# Patient Record
Sex: FEMALE | Race: BLACK OR AFRICAN AMERICAN | Employment: FULL TIME | URBAN - METROPOLITAN AREA
[De-identification: names, ages, dates, MRNs, and addresses within clinical notes are randomized per-mention and may not be internally consistent; named-entity substitution may affect disease eponyms.]

---

## 2017-03-17 ENCOUNTER — OFFICE VISIT (OUTPATIENT)
Dept: SURGERY | Facility: CLINIC | Age: 47
End: 2017-03-17

## 2017-03-17 VITALS
SYSTOLIC BLOOD PRESSURE: 138 MMHG | DIASTOLIC BLOOD PRESSURE: 79 MMHG | WEIGHT: 168.06 LBS | HEART RATE: 61 BPM | OXYGEN SATURATION: 100 % | BODY MASS INDEX: 30.93 KG/M2 | HEIGHT: 62 IN | RESPIRATION RATE: 16 BRPM

## 2017-03-17 DIAGNOSIS — I10 ESSENTIAL HYPERTENSION: Primary | ICD-10-CM

## 2017-03-17 DIAGNOSIS — K21.00 GASTROESOPHAGEAL REFLUX DISEASE WITH ESOPHAGITIS: ICD-10-CM

## 2017-03-17 DIAGNOSIS — R53.82 CHRONIC FATIGUE: ICD-10-CM

## 2017-03-17 DIAGNOSIS — Z98.84 S/P GASTRIC BYPASS: ICD-10-CM

## 2017-03-17 PROBLEM — R53.83 FATIGUE: Status: ACTIVE | Noted: 2017-03-17

## 2017-03-17 PROCEDURE — 99214 OFFICE O/P EST MOD 30 MIN: CPT | Performed by: INTERNAL MEDICINE

## 2017-03-17 NOTE — PROGRESS NOTES
[facility] BARIATRIC AND WEIGHT LOSS CLINIC  [address] [place] [address]  Dept: [phone]    Date: 2016    Patient:  Yelena Metz  :      1970  MRN:      [id number]    Referring Provider: Gennaro Ramírez MD BYPASS/LEEANNA-EN-Y  04/11/2016    Comment Dr Shawna Duckworth       Family History:    Family History   Problem Relation Age of Onset   • Dementia Father 80     Alzheimer's   • Cancer Mother      [de-identified], age 62 (cause of death)   • Heart Disease Sister 47     CAD elevated head of bed and not eat for 1 hour before bedtime. No interval changes were made in her anti-reflux medications.        Hold BP meds  Hold vitamin d    Continue optisource    Blood work due    [id number] Jayne Lema to exercise, keep area dry  Use Hebicleanse over bod

## 2017-04-03 ENCOUNTER — HOSPITAL ENCOUNTER (OUTPATIENT)
Dept: ULTRASOUND IMAGING | Facility: HOSPITAL | Age: 47
Discharge: HOME | End: 2017-04-03
Attending: INTERNAL MEDICINE
Payer: COMMERCIAL

## 2017-04-03 DIAGNOSIS — M79.606 LEG PAIN: ICD-10-CM

## 2017-04-03 DIAGNOSIS — J43.9 EMPHYSEMA LUNG (HCC): ICD-10-CM

## 2017-04-03 PROCEDURE — 93970 EXTREMITY STUDY: CPT

## 2017-05-01 ENCOUNTER — TELEPHONE (OUTPATIENT)
Dept: SURGERY | Facility: CLINIC | Age: 47
End: 2017-05-01

## 2017-05-02 ENCOUNTER — TELEPHONE (OUTPATIENT)
Dept: SURGERY | Facility: CLINIC | Age: 47
End: 2017-05-02

## 2017-05-03 ENCOUNTER — TELEPHONE (OUTPATIENT)
Dept: SURGERY | Facility: CLINIC | Age: 47
End: 2017-05-03

## 2017-05-17 ENCOUNTER — TELEPHONE (OUTPATIENT)
Dept: SURGERY | Facility: CLINIC | Age: 47
End: 2017-05-17

## 2017-05-17 NOTE — TELEPHONE ENCOUNTER
5/17/17 @ 3:03pm Spoke to [address] [place] [address] At [facility]) [phone].  OWK#[id number] She verified that patient has following benefits for Bariatric services: EHV (DFZ#[id number]) DX E66.01 Pt does NOT have  benefits for Dietitian ([id number]) and No Body Com

## 2017-05-18 ENCOUNTER — OFFICE VISIT (OUTPATIENT)
Dept: SURGERY | Facility: CLINIC | Age: 47
End: 2017-05-18

## 2017-05-18 VITALS
RESPIRATION RATE: 16 BRPM | DIASTOLIC BLOOD PRESSURE: 66 MMHG | BODY MASS INDEX: 29.81 KG/M2 | SYSTOLIC BLOOD PRESSURE: 123 MMHG | HEART RATE: 58 BPM | HEIGHT: 62 IN | WEIGHT: 162 LBS

## 2017-05-18 DIAGNOSIS — Z98.84 S/P GASTRIC BYPASS: Primary | ICD-10-CM

## 2017-05-18 RX ORDER — HYDROCHLOROTHIAZIDE 25 MG/1
25 TABLET ORAL DAILY
COMMUNITY
End: 2017-08-01

## 2017-05-18 NOTE — PROGRESS NOTES
[address], [address]  Dept: [phone]    5/18/2017    BARIATRIC SURGERY PATIENT/FOLLOW UP    HPI:  Yasmani Francois is a 55year old-year old female w mild moisture, no active rashes. ASSESSMENT:  Reji Rodríguez is a 55year old-year old female who presents 1 year s/p RYGB  Doing well - no major issues.   Still losing weight, and close to goal weight 155  Good habits - excellent fluid intake and

## 2017-05-31 ENCOUNTER — TELEPHONE (OUTPATIENT)
Dept: SURGERY | Facility: CLINIC | Age: 47
End: 2017-05-31

## 2017-07-28 ENCOUNTER — TELEPHONE (OUTPATIENT)
Dept: SURGERY | Facility: CLINIC | Age: 47
End: 2017-07-28

## 2017-07-28 ENCOUNTER — TELEPHONE (OUTPATIENT)
Dept: FAMILY MEDICINE CLINIC | Facility: CLINIC | Age: 47
End: 2017-07-28

## 2017-07-28 NOTE — TELEPHONE ENCOUNTER
Pt calm on phone in no apparent distress. Denies chest pain, SOB, difficulty breathing. Pt feels daily zyrtec use w/ antihypertensive med has caused this, when home in air conditioning feels better. Pt states will sit back and relax.  If symptoms worsen

## 2017-07-28 NOTE — TELEPHONE ENCOUNTER
Actions Requested: Scheduled for an appt with [facility] this afternoon. Problem: Occasional sob, not sure if it allergies  Onset and Timing: Couple of weeks.   Associated Symptoms: Eye swelling, nasal congestion, chest heaviness  Aggravating by: Exposure to plant

## 2017-08-01 ENCOUNTER — OFFICE VISIT (OUTPATIENT)
Dept: FAMILY MEDICINE CLINIC | Facility: CLINIC | Age: 47
End: 2017-08-01

## 2017-08-01 VITALS
HEART RATE: 69 BPM | SYSTOLIC BLOOD PRESSURE: 128 MMHG | TEMPERATURE: 98 F | BODY MASS INDEX: 29 KG/M2 | DIASTOLIC BLOOD PRESSURE: 85 MMHG | WEIGHT: 160 LBS

## 2017-08-01 DIAGNOSIS — I10 ESSENTIAL HYPERTENSION: Primary | ICD-10-CM

## 2017-08-01 DIAGNOSIS — Z91.09 ENVIRONMENTAL ALLERGIES: ICD-10-CM

## 2017-08-01 PROCEDURE — 99213 OFFICE O/P EST LOW 20 MIN: CPT | Performed by: FAMILY MEDICINE

## 2017-08-01 PROCEDURE — 99212 OFFICE O/P EST SF 10 MIN: CPT | Performed by: FAMILY MEDICINE

## 2017-08-01 RX ORDER — FLUTICASONE PROPIONATE 50 MCG
2 SPRAY, SUSPENSION (ML) NASAL DAILY
Qty: 3 BOTTLE | Refills: 1 | Status: SHIPPED | OUTPATIENT
Start: 2017-08-01 | End: 2019-08-27 | Stop reason: ALTCHOICE

## 2017-08-01 RX ORDER — HYDROCHLOROTHIAZIDE 25 MG/1
25 TABLET ORAL DAILY
Qty: 90 TABLET | Refills: 1 | Status: SHIPPED | OUTPATIENT
Start: 2017-08-01 | End: 2018-11-20

## 2017-08-01 RX ORDER — ALBUTEROL SULFATE 90 UG/1
2 AEROSOL, METERED RESPIRATORY (INHALATION) EVERY 6 HOURS PRN
Qty: 1 INHALER | Refills: 2 | Status: SHIPPED | OUTPATIENT
Start: 2017-08-01 | End: 2020-10-22

## 2017-08-01 NOTE — PROGRESS NOTES
HPI:    Patient ID: Ar Kang is a 55year old female. Had shobha enasal congestioin, heaviness of chest.   Slight cough. Felt tired also. Now much better after taking zyrtec.    Also exercises regularly and never has any chest pain when exerc

## 2018-04-04 ENCOUNTER — TELEPHONE (OUTPATIENT)
Dept: SURGERY | Facility: CLINIC | Age: 48
End: 2018-04-04

## 2018-04-04 NOTE — TELEPHONE ENCOUNTER
Attempted to contact pt as reminder to schedule 2 year post-op f/u with bariatric surgeon, dietitian and bariatrician. No answer then busy signal. Mailed reminder letter sent.

## 2018-11-07 ENCOUNTER — NURSE TRIAGE (OUTPATIENT)
Dept: OTHER | Age: 48
End: 2018-11-07

## 2018-11-07 ENCOUNTER — APPOINTMENT (OUTPATIENT)
Dept: GENERAL RADIOLOGY | Facility: HOSPITAL | Age: 48
End: 2018-11-07
Attending: NURSE PRACTITIONER
Payer: COMMERCIAL

## 2018-11-07 ENCOUNTER — HOSPITAL ENCOUNTER (EMERGENCY)
Facility: HOSPITAL | Age: 48
Discharge: HOME | End: 2018-11-07
Payer: COMMERCIAL

## 2018-11-07 VITALS
BODY MASS INDEX: 28.52 KG/M2 | WEIGHT: 155 LBS | SYSTOLIC BLOOD PRESSURE: 117 MMHG | HEIGHT: 62 IN | TEMPERATURE: 99 F | RESPIRATION RATE: 16 BRPM | DIASTOLIC BLOOD PRESSURE: 69 MMHG | HEART RATE: 72 BPM | OXYGEN SATURATION: 100 %

## 2018-11-07 DIAGNOSIS — J06.9 UPPER RESPIRATORY TRACT INFECTION, UNSPECIFIED TYPE: Primary | ICD-10-CM

## 2018-11-07 PROCEDURE — 99284 EMERGENCY DEPT VISIT MOD MDM: CPT

## 2018-11-07 PROCEDURE — 81003 URINALYSIS AUTO W/O SCOPE: CPT | Performed by: NURSE PRACTITIONER

## 2018-11-07 PROCEDURE — 71046 X-RAY EXAM CHEST 2 VIEWS: CPT | Performed by: NURSE PRACTITIONER

## 2018-11-07 RX ORDER — PREDNISONE 20 MG/1
40 TABLET ORAL DAILY
Qty: 6 TABLET | Refills: 0 | Status: SHIPPED | OUTPATIENT
Start: 2018-11-07 | End: 2018-11-10

## 2018-11-07 RX ORDER — BENZONATATE 100 MG/1
100 CAPSULE ORAL 3 TIMES DAILY PRN
Qty: 15 CAPSULE | Refills: 0 | Status: SHIPPED | OUTPATIENT
Start: 2018-11-07 | End: 2018-11-14

## 2018-11-07 RX ORDER — ONDANSETRON 4 MG/1
4 TABLET, ORALLY DISINTEGRATING ORAL EVERY 6 HOURS PRN
Qty: 15 TABLET | Refills: 0 | Status: SHIPPED | OUTPATIENT
Start: 2018-11-07 | End: 2018-11-14

## 2018-11-07 NOTE — TELEPHONE ENCOUNTER
Action Requested: Summary for Provider     []  Critical Lab, Recommendations Needed  [] Need Additional Advice  []   FYI    []   Need Orders  [] Need Medications Sent to Pharmacy  []  Other     SUMMARY: sent to ER-Pt called requesting Appt-c/c diarrhea/v

## 2018-11-07 NOTE — ED PROVIDER NOTES
Patient Seen in: [facility] AND [facility] Emergency Department    History   CC: congestion   HPI: Reji Rodríguez 50year old female  who presents to the ER c/o productive cough, runny nose, congestion, sinus pressure, generalized myalgias and chills starti Smokeless tobacco: Never Used    Alcohol use: Yes      Comment: Wine, 1 beer rarely    Drug use: No      ROS:  Review of Systems    Positive for stated complaint: congestion   Other systems are as noted in HPI. Constitutional and vital signs reviewed. posterior pharynx is mildly, diffusely erythematous however without tonsilar enlargement or exudate, uvula midline, +gag, voice is clear  Neck - no significant adenopathy, supple with trachea midline  Resp - Lung sounds clear bilaterally and wob unlabored, appear grossly dehydrated. Advised to push fluids as well as general viral/URI instructions reviewed with patient. Cough suppressant as well as nausea medication prescribed as needed.   Advise close follow-up with her primary care provider in the next 1-2

## 2018-11-07 NOTE — ED INITIAL ASSESSMENT (HPI)
Pt to ER with c/o runny nose, cough, vomiting, and diarrhea since yesterday. +body aches and chills. Last tylenol and cold medication at 0645 today. Pt denies chest pain. No respiratory distress noted. 100% on room air. Pt is alert and oriented x4.

## 2018-11-08 NOTE — TELEPHONE ENCOUNTER
CSS pls help schedule a ER f/u visit if needed.  Thanks  Clinical Impression:  Upper respiratory tract infection, unspecified type  (primary encounter diagnosis)     Discharge   Follow-up:  Tammy Garcia MD  Schedule an appointment as soon as possible for

## 2018-11-09 NOTE — TELEPHONE ENCOUNTER
Puma Magdaleno   Em Rn Triage 12 hours ago (6:51 PM)      Pt is scheduled for 11/13 (Routing comment)

## 2018-11-13 ENCOUNTER — OFFICE VISIT (OUTPATIENT)
Dept: FAMILY MEDICINE CLINIC | Facility: CLINIC | Age: 48
End: 2018-11-13
Payer: COMMERCIAL

## 2018-11-13 VITALS
DIASTOLIC BLOOD PRESSURE: 70 MMHG | SYSTOLIC BLOOD PRESSURE: 115 MMHG | HEART RATE: 67 BPM | BODY MASS INDEX: 29.04 KG/M2 | WEIGHT: 157.81 LBS | TEMPERATURE: 98 F | HEIGHT: 62 IN

## 2018-11-13 DIAGNOSIS — J06.9 UPPER RESPIRATORY INFECTION, ACUTE: Primary | ICD-10-CM

## 2018-11-13 PROCEDURE — 99213 OFFICE O/P EST LOW 20 MIN: CPT | Performed by: FAMILY MEDICINE

## 2018-11-13 PROCEDURE — 99212 OFFICE O/P EST SF 10 MIN: CPT | Performed by: FAMILY MEDICINE

## 2018-11-13 RX ORDER — CYCLOBENZAPRINE HCL 10 MG
10 TABLET ORAL 3 TIMES DAILY
Qty: 30 TABLET | Refills: 1 | Status: SHIPPED | OUTPATIENT
Start: 2018-11-13 | End: 2018-12-03

## 2018-11-13 RX ORDER — AZITHROMYCIN 250 MG/1
TABLET, FILM COATED ORAL
Qty: 6 TABLET | Refills: 0 | Status: SHIPPED | OUTPATIENT
Start: 2018-11-13 | End: 2019-08-27 | Stop reason: ALTCHOICE

## 2018-11-13 RX ORDER — RANITIDINE 150 MG/1
150 TABLET ORAL 2 TIMES DAILY
Qty: 40 TABLET | Refills: 0 | Status: SHIPPED | OUTPATIENT
Start: 2018-11-13 | End: 2019-08-27

## 2018-11-13 NOTE — PROGRESS NOTES
HPI:    Patient ID: Robby Radford is a 50year old female. HPI    Review of Systems         Current Outpatient Medications:  benzonatate 100 MG Oral Cap Take 1 capsule (100 mg total) by mouth 3 (three) times daily as needed for cough.  Disp: 15 cap

## 2018-11-13 NOTE — PROGRESS NOTES
HPI:    Patient ID: Rina Guardado is a 50year old female. Since 1 week ago had cough shortness of breath, dizziness, vomiting and diarrhea. Had low grade fever. No more vomiting since yesterday. Cough is mostly dry , still short breath. ..   Has Exam reveals no gallop and no friction rub. No murmur heard. Pulmonary/Chest: Effort normal and breath sounds normal.   Prolonged exp phase   Musculoskeletal: She exhibits tenderness. She exhibits no edema or deformity.    Trapezius tenderness and spasm

## 2018-11-20 ENCOUNTER — OFFICE VISIT (OUTPATIENT)
Dept: SURGERY | Facility: CLINIC | Age: 48
End: 2018-11-20
Payer: COMMERCIAL

## 2018-11-20 VITALS
DIASTOLIC BLOOD PRESSURE: 79 MMHG | BODY MASS INDEX: 28.71 KG/M2 | HEIGHT: 62 IN | RESPIRATION RATE: 16 BRPM | WEIGHT: 156 LBS | HEART RATE: 89 BPM | OXYGEN SATURATION: 100 % | SYSTOLIC BLOOD PRESSURE: 125 MMHG

## 2018-11-20 DIAGNOSIS — I10 ESSENTIAL HYPERTENSION: Primary | ICD-10-CM

## 2018-11-20 DIAGNOSIS — Z98.84 S/P GASTRIC BYPASS: ICD-10-CM

## 2018-11-20 DIAGNOSIS — K21.00 GASTROESOPHAGEAL REFLUX DISEASE WITH ESOPHAGITIS: ICD-10-CM

## 2018-11-20 DIAGNOSIS — R73.09 ABNORMAL BLOOD SUGAR: ICD-10-CM

## 2018-11-20 DIAGNOSIS — E55.9 VITAMIN D DEFICIENCY: ICD-10-CM

## 2018-11-20 DIAGNOSIS — E66.3 OVERWEIGHT (BMI 25.0-29.9): ICD-10-CM

## 2018-11-20 PROCEDURE — 99214 OFFICE O/P EST MOD 30 MIN: CPT | Performed by: INTERNAL MEDICINE

## 2018-11-20 NOTE — PROGRESS NOTES
[facility] BARIATRIC AND WEIGHT LOSS CLINIC  [address] [place] [address]  Dept: [phone]    Date: 2016    Patient:  Garcia Hernandez  :      1970  MRN:      [id number]    Referring Provider: Jerzy Jang MD MCG/ACT Nasal Suspension, 2 sprays by Nasal route daily. , Disp: 3 Bottle, Rfl: 1    Allergies:  Patient has no known allergies.      Social History:  Social History    Tobacco Use      Smoking status: Never Smoker      Smokeless tobacco: Never Used    Alcoh hyper-somnolence. GERD:  The patient states her acid reflux has been well controlled with her current medication. No symptoms of heartburn or indigestion.     Encounter Diagnosis(ses):   Essential hypertension  (primary encounter diagnosis)  Rocio Gaines CBC With Differential With Platelet          Standing Status: Future          Standing Expiration Date: 11/20/2019        Cha Townsend MD

## 2018-11-21 ENCOUNTER — LAB ENCOUNTER (OUTPATIENT)
Dept: LAB | Facility: HOSPITAL | Age: 48
End: 2018-11-21
Attending: INTERNAL MEDICINE
Payer: COMMERCIAL

## 2018-11-21 DIAGNOSIS — K21.00 GASTROESOPHAGEAL REFLUX DISEASE WITH ESOPHAGITIS: ICD-10-CM

## 2018-11-21 DIAGNOSIS — I10 ESSENTIAL HYPERTENSION: ICD-10-CM

## 2018-11-21 DIAGNOSIS — E55.9 VITAMIN D DEFICIENCY: ICD-10-CM

## 2018-11-21 DIAGNOSIS — E66.3 OVERWEIGHT (BMI 25.0-29.9): ICD-10-CM

## 2018-11-21 DIAGNOSIS — Z98.84 S/P GASTRIC BYPASS: ICD-10-CM

## 2018-11-21 DIAGNOSIS — R73.09 ABNORMAL BLOOD SUGAR: ICD-10-CM

## 2018-11-21 PROCEDURE — 84443 ASSAY THYROID STIM HORMONE: CPT

## 2018-11-21 PROCEDURE — 83036 HEMOGLOBIN GLYCOSYLATED A1C: CPT

## 2018-11-21 PROCEDURE — 85025 COMPLETE CBC W/AUTO DIFF WBC: CPT

## 2018-11-21 PROCEDURE — 80061 LIPID PANEL: CPT

## 2018-11-21 PROCEDURE — 80053 COMPREHEN METABOLIC PANEL: CPT

## 2018-11-21 PROCEDURE — 82306 VITAMIN D 25 HYDROXY: CPT

## 2018-11-21 PROCEDURE — 36415 COLL VENOUS BLD VENIPUNCTURE: CPT

## 2018-11-21 PROCEDURE — 82607 VITAMIN B-12: CPT

## 2018-11-21 PROCEDURE — 84425 ASSAY OF VITAMIN B-1: CPT

## 2018-11-28 ENCOUNTER — OFFICE VISIT (OUTPATIENT)
Dept: SURGERY | Facility: CLINIC | Age: 48
End: 2018-11-28
Payer: COMMERCIAL

## 2018-11-28 ENCOUNTER — TELEPHONE (OUTPATIENT)
Dept: SURGERY | Facility: CLINIC | Age: 48
End: 2018-11-28

## 2018-11-28 VITALS — WEIGHT: 158 LBS | BODY MASS INDEX: 29.08 KG/M2 | HEIGHT: 62 IN

## 2018-11-28 DIAGNOSIS — E66.3 OVERWEIGHT (BMI 25.0-29.9): ICD-10-CM

## 2018-11-28 DIAGNOSIS — Z98.84 S/P GASTRIC BYPASS: Primary | ICD-10-CM

## 2018-11-28 PROCEDURE — 97803 MED NUTRITION INDIV SUBSEQ: CPT | Performed by: DIETITIAN, REGISTERED

## 2018-11-28 PROCEDURE — 0358T BIA WHOLE BODY: CPT | Performed by: DIETITIAN, REGISTERED

## 2018-11-28 NOTE — PROGRESS NOTES
[address] [place] [address] AND WEIGHT LOSS CLINIC  [address] [place] [address]  Dept: [phone]  Loc: [phone]    11/28/18      Bariatric Follow-up Nutrition Session    Yasmani Francois is a 50year old female. Vitamins/Minerals:  Lab Results   Component Value Date    B12 831 11/21/2018    VITB12 927 (H) 09/01/2016     Lab Results   Component Value Date    TPQK56EF 14.8 (L) 11/21/2018     Lab Results   Component Value Date/Time    THIAMINE 75 11/21/2018 08:17 lean chicken, 0.5 c raw vegetables       Total Calories:  ~823 kcals to 1000 kcals  Excessive in: nothing, prefer pt drink skim milk vs whole  Inadequate in:  fruits, vegetables and fiber     Patient has made some modifications and adjustments to diet: yes

## 2018-11-28 NOTE — TELEPHONE ENCOUNTER
She should be taking her bariatric vitamins     Both thiamine and vitamin D are low        Called patient and let know her know of her results per dr Elisa Leblanc. Patient states she does not take the bariatric vitamins daily.  I instructed her to take carrie

## 2019-03-19 ENCOUNTER — TELEPHONE (OUTPATIENT)
Dept: SURGERY | Facility: CLINIC | Age: 49
End: 2019-03-19

## 2019-03-19 NOTE — TELEPHONE ENCOUNTER
I called the patient to confirm her new consult appointment with Dr. Imer Guadarrama this Friday-   The patient would like to reschedule this appointment and will anticipate a call back. This message was routed to our PSR's.

## 2019-04-23 ENCOUNTER — TELEPHONE (OUTPATIENT)
Dept: SURGERY | Facility: CLINIC | Age: 49
End: 2019-04-23

## 2019-08-27 ENCOUNTER — TELEPHONE (OUTPATIENT)
Dept: FAMILY MEDICINE CLINIC | Facility: CLINIC | Age: 49
End: 2019-08-27

## 2019-08-27 ENCOUNTER — LAB ENCOUNTER (OUTPATIENT)
Dept: LAB | Age: 49
End: 2019-08-27
Attending: FAMILY MEDICINE
Payer: COMMERCIAL

## 2019-08-27 ENCOUNTER — OFFICE VISIT (OUTPATIENT)
Dept: FAMILY MEDICINE CLINIC | Facility: CLINIC | Age: 49
End: 2019-08-27
Payer: COMMERCIAL

## 2019-08-27 VITALS
HEIGHT: 62 IN | SYSTOLIC BLOOD PRESSURE: 153 MMHG | WEIGHT: 176.81 LBS | HEART RATE: 86 BPM | TEMPERATURE: 98 F | BODY MASS INDEX: 32.54 KG/M2 | DIASTOLIC BLOOD PRESSURE: 95 MMHG

## 2019-08-27 DIAGNOSIS — D50.8 OTHER IRON DEFICIENCY ANEMIA: Primary | ICD-10-CM

## 2019-08-27 DIAGNOSIS — I10 ESSENTIAL HYPERTENSION: ICD-10-CM

## 2019-08-27 DIAGNOSIS — D50.8 OTHER IRON DEFICIENCY ANEMIA: ICD-10-CM

## 2019-08-27 DIAGNOSIS — E55.9 VITAMIN D DEFICIENCY: ICD-10-CM

## 2019-08-27 LAB
IRON SATURATION: 4 % (ref 15–50)
IRON SERPL-MCNC: 19 UG/DL (ref 50–170)
TOTAL IRON BINDING CAPACITY: 523 UG/DL (ref 240–450)
TRANSFERRIN SERPL-MCNC: 351 MG/DL (ref 200–360)

## 2019-08-27 PROCEDURE — 99213 OFFICE O/P EST LOW 20 MIN: CPT | Performed by: FAMILY MEDICINE

## 2019-08-27 PROCEDURE — 85060 BLOOD SMEAR INTERPRETATION: CPT

## 2019-08-27 PROCEDURE — 99212 OFFICE O/P EST SF 10 MIN: CPT | Performed by: FAMILY MEDICINE

## 2019-08-27 PROCEDURE — 83540 ASSAY OF IRON: CPT

## 2019-08-27 PROCEDURE — 85025 COMPLETE CBC W/AUTO DIFF WBC: CPT

## 2019-08-27 PROCEDURE — 82306 VITAMIN D 25 HYDROXY: CPT

## 2019-08-27 PROCEDURE — 84466 ASSAY OF TRANSFERRIN: CPT

## 2019-08-27 PROCEDURE — 36415 COLL VENOUS BLD VENIPUNCTURE: CPT

## 2019-08-27 RX ORDER — HYDROCHLOROTHIAZIDE 25 MG/1
25 TABLET ORAL DAILY
Qty: 90 TABLET | Refills: 1 | Status: SHIPPED | OUTPATIENT
Start: 2019-08-27 | End: 2020-11-14

## 2019-08-27 RX ORDER — ERGOCALCIFEROL 1.25 MG/1
50000 CAPSULE ORAL WEEKLY
Qty: 12 CAPSULE | Refills: 2 | Status: SHIPPED | OUTPATIENT
Start: 2019-08-27 | End: 2019-11-13

## 2019-08-27 RX ORDER — CARVEDILOL 6.25 MG/1
TABLET ORAL
Qty: 180 TABLET | Refills: 0 | Status: SHIPPED | OUTPATIENT
Start: 2019-08-27 | End: 2020-11-14

## 2019-08-27 RX ORDER — FERROUS SULFATE 325(65) MG
325 TABLET ORAL 2 TIMES DAILY
Qty: 60 TABLET | Refills: 2 | Status: SHIPPED | OUTPATIENT
Start: 2019-08-27 | End: 2020-11-14

## 2019-08-27 RX ORDER — RANITIDINE 150 MG/1
150 TABLET ORAL 2 TIMES DAILY
Qty: 40 TABLET | Refills: 0 | Status: SHIPPED | OUTPATIENT
Start: 2019-08-27 | End: 2020-10-22

## 2019-08-27 NOTE — PROGRESS NOTES
HPI:    Patient ID: Shashi Paez is a 52year old female. Doing well but was not taking her medications. Review of Systems   Constitutional: Negative. Negative for activity change, diaphoresis, fatigue and fever. HENT: Negative.     Respi D, 25-Hydroxy [E]      CBC W Differential W Platelet [E]      Iron And Tibc [E]      Meds This Visit:  Requested Prescriptions     Signed Prescriptions Disp Refills   • raNITIdine HCl (ZANTAC) 150 MG Oral Tab 40 tablet 0     Sig: Take 1 tablet (150 mg tota

## 2019-08-28 LAB
25(OH)D3 SERPL-MCNC: 22.1 NG/ML (ref 30–100)
BASOPHILS # BLD AUTO: 0.03 X10(3) UL (ref 0–0.2)
BASOPHILS NFR BLD AUTO: 0.6 %
DEPRECATED RDW RBC AUTO: 44.4 FL (ref 35.1–46.3)
EOSINOPHIL # BLD AUTO: 0.14 X10(3) UL (ref 0–0.7)
EOSINOPHIL NFR BLD AUTO: 2.8 %
ERYTHROCYTE [DISTWIDTH] IN BLOOD BY AUTOMATED COUNT: 19.3 % (ref 11–15)
HCT VFR BLD AUTO: 33.1 % (ref 35–48)
HGB BLD-MCNC: 9.9 G/DL (ref 12–16)
IMM GRANULOCYTES # BLD AUTO: 0.01 X10(3) UL (ref 0–1)
IMM GRANULOCYTES NFR BLD: 0.2 %
LYMPHOCYTES # BLD AUTO: 1.89 X10(3) UL (ref 1–4)
LYMPHOCYTES NFR BLD AUTO: 37.1 %
MCH RBC QN AUTO: 19.8 PG (ref 26–34)
MCHC RBC AUTO-ENTMCNC: 29.9 G/DL (ref 31–37)
MCV RBC AUTO: 66.3 FL (ref 80–100)
MONOCYTES # BLD AUTO: 0.38 X10(3) UL (ref 0.1–1)
MONOCYTES NFR BLD AUTO: 7.5 %
NEUTROPHILS # BLD AUTO: 2.64 X10 (3) UL (ref 1.5–7.7)
NEUTROPHILS # BLD AUTO: 2.64 X10(3) UL (ref 1.5–7.7)
NEUTROPHILS NFR BLD AUTO: 51.8 %
PLATELET # BLD AUTO: 428 10(3)UL (ref 150–450)
RBC # BLD AUTO: 4.99 X10(6)UL (ref 3.8–5.3)
WBC # BLD AUTO: 5.1 X10(3) UL (ref 4–11)

## 2019-09-10 ENCOUNTER — OFFICE VISIT (OUTPATIENT)
Dept: FAMILY MEDICINE CLINIC | Facility: CLINIC | Age: 49
End: 2019-09-10
Payer: COMMERCIAL

## 2019-09-10 VITALS
WEIGHT: 176 LBS | SYSTOLIC BLOOD PRESSURE: 129 MMHG | HEART RATE: 80 BPM | TEMPERATURE: 98 F | BODY MASS INDEX: 32.39 KG/M2 | DIASTOLIC BLOOD PRESSURE: 81 MMHG | HEIGHT: 62 IN

## 2019-09-10 DIAGNOSIS — D50.8 OTHER IRON DEFICIENCY ANEMIA: ICD-10-CM

## 2019-09-10 DIAGNOSIS — I10 ESSENTIAL HYPERTENSION: ICD-10-CM

## 2019-09-10 DIAGNOSIS — D50.9 IRON DEFICIENCY ANEMIA, UNSPECIFIED IRON DEFICIENCY ANEMIA TYPE: Primary | ICD-10-CM

## 2019-09-10 PROCEDURE — 99213 OFFICE O/P EST LOW 20 MIN: CPT | Performed by: FAMILY MEDICINE

## 2019-09-10 PROCEDURE — 99212 OFFICE O/P EST SF 10 MIN: CPT | Performed by: FAMILY MEDICINE

## 2019-09-10 NOTE — PROGRESS NOTES
HPI:    Patient ID: Philip Jerry is a 52year old female. Doing well with hyertension   Also started taking iron anad vitamin B12 and feels much better      Review of Systems   Constitutional: Negative.   Negative for activity change, appetite margaret Differential W Platelet [E]      Iron And Tibc [E]      Vitamin B12 [E]      Vitamin B1 (Thiamine), Blood [E]      Meds This Visit:  Requested Prescriptions      No prescriptions requested or ordered in this encounter       Imaging & Referrals:  None

## 2020-04-14 ENCOUNTER — TELEPHONE (OUTPATIENT)
Dept: SURGERY | Facility: CLINIC | Age: 50
End: 2020-04-14

## 2020-09-19 ENCOUNTER — HOSPITAL ENCOUNTER (INPATIENT)
Facility: HOSPITAL | Age: 50
LOS: 7 days | Discharge: HOME | DRG: 387 | End: 2020-09-26
Attending: EMERGENCY MEDICINE | Admitting: HOSPITALIST
Payer: COMMERCIAL

## 2020-09-19 ENCOUNTER — APPOINTMENT (OUTPATIENT)
Dept: CT IMAGING | Facility: HOSPITAL | Age: 50
DRG: 387 | End: 2020-09-19
Attending: EMERGENCY MEDICINE
Payer: COMMERCIAL

## 2020-09-19 DIAGNOSIS — K52.9 ACUTE COLITIS: Primary | ICD-10-CM

## 2020-09-19 DIAGNOSIS — E87.6 HYPOKALEMIA: ICD-10-CM

## 2020-09-19 DIAGNOSIS — K51.90 SEVERE ULCERATIVE COLITIS (HCC): ICD-10-CM

## 2020-09-19 DIAGNOSIS — K57.90 DIVERTICULOSIS: ICD-10-CM

## 2020-09-19 PROCEDURE — 74177 CT ABD & PELVIS W/CONTRAST: CPT | Performed by: EMERGENCY MEDICINE

## 2020-09-19 PROCEDURE — 99254 IP/OBS CNSLTJ NEW/EST MOD 60: CPT | Performed by: INTERNAL MEDICINE

## 2020-09-19 PROCEDURE — 99223 1ST HOSP IP/OBS HIGH 75: CPT | Performed by: HOSPITALIST

## 2020-09-19 RX ORDER — PANTOPRAZOLE SODIUM 20 MG/1
20 TABLET, DELAYED RELEASE ORAL 2 TIMES DAILY
Status: DISCONTINUED | OUTPATIENT
Start: 2020-09-19 | End: 2020-09-26

## 2020-09-19 RX ORDER — POTASSIUM CHLORIDE 20 MEQ/1
40 TABLET, EXTENDED RELEASE ORAL ONCE
Status: DISCONTINUED | OUTPATIENT
Start: 2020-09-19 | End: 2020-09-19

## 2020-09-19 RX ORDER — CIPROFLOXACIN 2 MG/ML
400 INJECTION, SOLUTION INTRAVENOUS EVERY 12 HOURS
Status: DISCONTINUED | OUTPATIENT
Start: 2020-09-20 | End: 2020-09-20

## 2020-09-19 RX ORDER — ACETAMINOPHEN 325 MG/1
650 TABLET ORAL EVERY 6 HOURS PRN
Status: DISCONTINUED | OUTPATIENT
Start: 2020-09-19 | End: 2020-09-26

## 2020-09-19 RX ORDER — METRONIDAZOLE 500 MG/100ML
500 INJECTION, SOLUTION INTRAVENOUS ONCE
Status: COMPLETED | OUTPATIENT
Start: 2020-09-19 | End: 2020-09-19

## 2020-09-19 RX ORDER — HYDROCHLOROTHIAZIDE 25 MG/1
25 TABLET ORAL DAILY
Status: DISCONTINUED | OUTPATIENT
Start: 2020-09-20 | End: 2020-09-26

## 2020-09-19 RX ORDER — MELATONIN
325 2 TIMES DAILY
Status: DISCONTINUED | OUTPATIENT
Start: 2020-09-19 | End: 2020-09-21

## 2020-09-19 RX ORDER — METRONIDAZOLE 500 MG/100ML
500 INJECTION, SOLUTION INTRAVENOUS EVERY 8 HOURS
Status: DISCONTINUED | OUTPATIENT
Start: 2020-09-19 | End: 2020-09-20

## 2020-09-19 RX ORDER — ALBUTEROL SULFATE 90 UG/1
2 AEROSOL, METERED RESPIRATORY (INHALATION) EVERY 6 HOURS PRN
Status: DISCONTINUED | OUTPATIENT
Start: 2020-09-19 | End: 2020-09-26

## 2020-09-19 RX ORDER — MORPHINE SULFATE 4 MG/ML
2 INJECTION, SOLUTION INTRAMUSCULAR; INTRAVENOUS ONCE
Status: COMPLETED | OUTPATIENT
Start: 2020-09-19 | End: 2020-09-19

## 2020-09-19 RX ORDER — SODIUM CHLORIDE 9 MG/ML
INJECTION, SOLUTION INTRAVENOUS CONTINUOUS
Status: DISCONTINUED | OUTPATIENT
Start: 2020-09-19 | End: 2020-09-25

## 2020-09-19 RX ORDER — ONDANSETRON 2 MG/ML
4 INJECTION INTRAMUSCULAR; INTRAVENOUS ONCE
Status: COMPLETED | OUTPATIENT
Start: 2020-09-19 | End: 2020-09-19

## 2020-09-19 RX ORDER — POTASSIUM CHLORIDE 14.9 MG/ML
20 INJECTION INTRAVENOUS ONCE
Status: COMPLETED | OUTPATIENT
Start: 2020-09-19 | End: 2020-09-19

## 2020-09-19 RX ORDER — SODIUM CHLORIDE 0.9 % (FLUSH) 0.9 %
3 SYRINGE (ML) INJECTION AS NEEDED
Status: DISCONTINUED | OUTPATIENT
Start: 2020-09-19 | End: 2020-09-26

## 2020-09-19 RX ORDER — MORPHINE SULFATE 4 MG/ML
4 INJECTION, SOLUTION INTRAMUSCULAR; INTRAVENOUS EVERY 2 HOUR PRN
Status: DISCONTINUED | OUTPATIENT
Start: 2020-09-19 | End: 2020-09-26

## 2020-09-19 RX ORDER — METHYLPREDNISOLONE SODIUM SUCCINATE 40 MG/ML
20 INJECTION, POWDER, LYOPHILIZED, FOR SOLUTION INTRAMUSCULAR; INTRAVENOUS EVERY 8 HOURS
Status: DISCONTINUED | OUTPATIENT
Start: 2020-09-19 | End: 2020-09-23

## 2020-09-19 RX ORDER — HEPARIN SODIUM 5000 [USP'U]/ML
5000 INJECTION, SOLUTION INTRAVENOUS; SUBCUTANEOUS EVERY 12 HOURS SCHEDULED
Status: DISCONTINUED | OUTPATIENT
Start: 2020-09-19 | End: 2020-09-26

## 2020-09-19 RX ORDER — FAMOTIDINE 10 MG/ML
20 INJECTION, SOLUTION INTRAVENOUS ONCE
Status: COMPLETED | OUTPATIENT
Start: 2020-09-19 | End: 2020-09-19

## 2020-09-19 RX ORDER — CARVEDILOL 6.25 MG/1
6.25 TABLET ORAL 2 TIMES DAILY WITH MEALS
Status: DISCONTINUED | OUTPATIENT
Start: 2020-09-19 | End: 2020-09-26

## 2020-09-19 RX ORDER — MORPHINE SULFATE 2 MG/ML
2 INJECTION, SOLUTION INTRAMUSCULAR; INTRAVENOUS EVERY 2 HOUR PRN
Status: DISCONTINUED | OUTPATIENT
Start: 2020-09-19 | End: 2020-09-26

## 2020-09-19 RX ORDER — MORPHINE SULFATE 2 MG/ML
1 INJECTION, SOLUTION INTRAMUSCULAR; INTRAVENOUS EVERY 2 HOUR PRN
Status: DISCONTINUED | OUTPATIENT
Start: 2020-09-19 | End: 2020-09-26

## 2020-09-19 RX ORDER — VANCOMYCIN HYDROCHLORIDE 125 MG/1
125 CAPSULE ORAL EVERY 6 HOURS
Status: DISCONTINUED | OUTPATIENT
Start: 2020-09-19 | End: 2020-09-20

## 2020-09-19 RX ORDER — CIPROFLOXACIN 2 MG/ML
400 INJECTION, SOLUTION INTRAVENOUS ONCE
Status: DISCONTINUED | OUTPATIENT
Start: 2020-09-19 | End: 2020-09-19 | Stop reason: ALTCHOICE

## 2020-09-19 NOTE — ED NOTES
Orders for admission, patient is aware of plan and ready to go upstairs. Any questions, please call ED FARSHAD Delong  at extension [phone].     Type of COVID test sent:   COVID Suspicion level: Low  Titratable drug(s) infusing: None  Rate:    LOC at time

## 2020-09-19 NOTE — ED INITIAL ASSESSMENT (HPI)
Pt came in for ongoing diarrhea with blood streaks since 8/20. Hx of UC, came in for increased abdominal pain and dehydration. RR even and nonlabored, speaking in full sentences, ambulatory with steady gait.

## 2020-09-19 NOTE — H&P (VIEW-ONLY)
[address]  Report of GI Consultation    [facility] Patient Status:  Emergency    1970 MRN [id number]   Location [address] Attending Rudy Harris MD   Hosp Day # 0 PCP Nancy Hill MD     Da 8/30/2020; passing large clots past few days.     Evaluation in the ED was significant for temp 100.6,  stable BP; later temp 100.0 at 4pm.    Labs today show WBC 7500, Hgb 10.3g / mcv 73 similar to 8/27/2019; normal LFTs AST 17 ALT 12 but hypoalbumin kg)  11/20/18 : 156 lb (70.8 kg)  11/13/18 : 157 lb 12.8 oz (71.6 kg)  11/07/18 : 155 lb (70.3 kg)  08/01/17 : 160 lb (72.6 kg)  05/18/17 : 162 lb (73.5 kg)  03/17/17 : 168 lb 1 oz (76.2 kg)  12/23/16 : 172 lb 7 oz (78.2 kg)  12/23/16 : 172 lb 11.2 oz (78. broad-spectrum antibiotics due to significant pain tenderness on arrival in ED and CT findings, ciprofloxacin and metronidazole  · I asked that ED send stool for C diff assay; will start empiric enteral Vanco until resulted.   · Start empiric Solu-Medrol, 2 Once    •  Potassium Chloride ER (K-DUR M20) CR tab 40 mEq, 40 mEq, Oral, Once    •  Ciprofloxacin in D5W (CIPRO) IVPB premix SOLN 400 mg, 400 mg, Intravenous, Once    •  metRONIDAZOLE in NaCl (FLAGYL) 5 mg/ml IVPB premix 500 mg, 500 mg, Intravenous, Once 9/19/2020  CONCLUSION:   Moderate to severe colitis involving majority of the left colon extending into the sigmoid colon and rectum. Findings are likely secondary to patient's known ulcerative colitis. No obstruction.   4.9 cm left adnexal cyst.  Nonemer

## 2020-09-19 NOTE — H&P
[facility]    PATIENT'S NAME: Dafne Wells   ATTENDING PHYSICIAN: Toby Gamez MD   PATIENT ACCOUNT#:   [id number]    LOCATION:  [address] RECORD #:   [id number]       YOB: 1970  ADMISSION DATE:       09/19 alcohol. REVIEW OF SYSTEMS:  A 10-point review of systems has been obtained and is otherwise negative. PHYSICAL EXAMINATION:    GENERAL:  Patient is lying in bed, appears to be in no acute distress at this time. She is A and O x3.   VITAL SIGNS:  B the stool. 5.   Disposition: At this time, we will continue to monitor her closely. Patient is a Full Code. Further recommendations to follow. Appreciate recommendations from Gastroenterology.     Dictated By Con Runner, MD  d: 09/19/2020 16:51:

## 2020-09-19 NOTE — PLAN OF CARE
Febrile. Tylenol given per orders. Morphine for pain. Tolerating clear liquid diet.    Problem: Patient Centered Care  Goal: Patient preferences are identified and integrated in the patient's plan of care  Description: Interventions:  - What would you like Initiate emergency measures for life threatening arrhythmias  - Monitor electrolytes and administer replacement therapy as ordered  Outcome: Progressing     Problem: GASTROINTESTINAL - ADULT  Goal: Minimal or absence of nausea and vomiting  Description: IN for patient's volume status, including labs, urine output, blood pressure (other measures as available)  - Encourage oral intake as appropriate  - Instruct patient on fluid and nutrition restrictions as appropriate  Outcome: Progressing

## 2020-09-19 NOTE — ED PROVIDER NOTES
Patient Seen in: [facility] Emergency Department    History   Patient presents with:  Nausea/Vomiting/Diarrhea    Stated Complaint:     HPI    Patient complains of l sided abdominal pain that began 6 days ago. .  Pain described as sharp crampy. Riya HINES needed for Wheezing.        Family History   Problem Relation Age of Onset   • Dementia Father 80        Alzheimer's   • Cancer Mother         [de-identified], age 62 (cause of death)   • Heart Disease Sister 47        CAD       Social History    Tobacco Use components:       Result Value    Potassium 2.7 (*)     BUN/CREA Ratio 7.7 (*)     Calcium, Total 8.0 (*)     All other components within normal limits   HEPATIC FUNCTION PANEL (7) - Abnormal; Notable for the following components:    ALT 12 (*)     Alkalin other incidental findings as described in the body of the report.     Dictated by (CST): Bijal Fuller MD on 9/19/2020 at 11:44 AM     Finalized by (CST): Bijal Fuller MD on 9/19/2020 at 11:55 AM              Procedures:      Critical Care:      Admit dw GI

## 2020-09-19 NOTE — PROGRESS NOTES
Therapeutic interchange from Zantac 150mg  to Pantoprazole 20mg per P&T approved protocol.      Ailyn TurnerD

## 2020-09-20 PROCEDURE — 99232 SBSQ HOSP IP/OBS MODERATE 35: CPT | Performed by: INTERNAL MEDICINE

## 2020-09-20 PROCEDURE — 99233 SBSQ HOSP IP/OBS HIGH 50: CPT | Performed by: HOSPITALIST

## 2020-09-20 RX ORDER — POTASSIUM CHLORIDE 1.5 G/1.77G
40 POWDER, FOR SOLUTION ORAL ONCE
Status: COMPLETED | OUTPATIENT
Start: 2020-09-20 | End: 2020-09-20

## 2020-09-20 NOTE — PLAN OF CARE
Problem: METABOLIC/FLUID AND ELECTROLYTES - ADULT  Goal: Hemodynamic stability and optimal renal function maintained  Description: INTERVENTIONS:  - Monitor labs and assess for signs and symptoms of volume excess or deficit  - Monitor intake, output and ordered and tolerated  - Evaluate effectiveness of GI medications  - Encourage mobilization and activity  - Obtain nutritional consult as needed  - Establish a toileting routine/schedule  - Consider collaborating with pharmacy to review patient's medicatio

## 2020-09-20 NOTE — PROGRESS NOTES
[address]  GI SERVICE PROGRESS NOTE    Juan David Oliva Lynette Patient Status:  Inpatient    1970 MRN [id number]   Location [address] [place] [address] Attending Angelique aSnabria MD   Hosp Day # 1 PCP Albino Asencio MD       Subjective:     Lexa Hayden 09/20/20  0628   MG 2.1       No results for input(s): URINE, CULTI, BLDSMR in the last 168 hours.       Ct Abdomen Pelvis Iv Contrast, No Oral (er)    Result Date: 9/19/2020  CONCLUSION:   Moderate to severe colitis involving majority of the left colon ext assay --> negative; GI stool PCR assay also negative for infectious pathogens  · Trial of low-fat diet today 9/20/2020  · Initially broad-spectrum antibiotics due to significant pain tenderness on arrival in ED and CT findings, ciprofloxacin and metronidaz

## 2020-09-20 NOTE — PROGRESS NOTES
[facility]    Progress Note    Libby Noe Patient Status:  Inpatient    1970 MRN [id number]   Location [address] [facility] Attending Arturo Cano MD   Hosp Day # 1 PCP Addi Whaley MD        Subjective:   Junior Diop 0.85 09/20/2020    BUN 8 09/20/2020     09/20/2020    K 3.9 09/20/2020     09/20/2020    CO2 27.0 09/20/2020     (H) 09/20/2020    CA 7.9 (L) 09/20/2020    ALB 2.5 (L) 09/19/2020    ALKPHO 106 (H) 09/19/2020    BILT 0.3 09/19/2020    TP

## 2020-09-20 NOTE — PLAN OF CARE
VSS. Afebrile. Tolerating diet. Morphine IV for pain. ABX discontinued.    Problem: Patient Centered Care  Goal: Patient preferences are identified and integrated in the patient's plan of care  Description: Interventions:  - What would you like us to know a measures for life threatening arrhythmias  - Monitor electrolytes and administer replacement therapy as ordered  Outcome: Progressing     Problem: GASTROINTESTINAL - ADULT  Goal: Minimal or absence of nausea and vomiting  Description: INTERVENTIONS:  Laurann Closs volume status, including labs, urine output, blood pressure (other measures as available)  - Encourage oral intake as appropriate  - Instruct patient on fluid and nutrition restrictions as appropriate  Outcome: Progressing     Problem: SAFETY ADULT - FALL

## 2020-09-21 ENCOUNTER — ANESTHESIA (OUTPATIENT)
Dept: ENDOSCOPY | Facility: HOSPITAL | Age: 50
DRG: 387 | End: 2020-09-21
Payer: COMMERCIAL

## 2020-09-21 ENCOUNTER — ANESTHESIA EVENT (OUTPATIENT)
Dept: ENDOSCOPY | Facility: HOSPITAL | Age: 50
DRG: 387 | End: 2020-09-21
Payer: COMMERCIAL

## 2020-09-21 PROCEDURE — 0DBN8ZX EXCISION OF SIGMOID COLON, VIA NATURAL OR ARTIFICIAL OPENING ENDOSCOPIC, DIAGNOSTIC: ICD-10-PCS | Performed by: INTERNAL MEDICINE

## 2020-09-21 PROCEDURE — 99232 SBSQ HOSP IP/OBS MODERATE 35: CPT | Performed by: HOSPITALIST

## 2020-09-21 PROCEDURE — 45331 SIGMOIDOSCOPY AND BIOPSY: CPT | Performed by: INTERNAL MEDICINE

## 2020-09-21 RX ORDER — SODIUM CHLORIDE, SODIUM LACTATE, POTASSIUM CHLORIDE, CALCIUM CHLORIDE 600; 310; 30; 20 MG/100ML; MG/100ML; MG/100ML; MG/100ML
INJECTION, SOLUTION INTRAVENOUS CONTINUOUS
Status: DISCONTINUED | OUTPATIENT
Start: 2020-09-21 | End: 2020-09-21

## 2020-09-21 RX ORDER — NALOXONE HYDROCHLORIDE 0.4 MG/ML
80 INJECTION, SOLUTION INTRAMUSCULAR; INTRAVENOUS; SUBCUTANEOUS AS NEEDED
Status: DISCONTINUED | OUTPATIENT
Start: 2020-09-21 | End: 2020-09-21 | Stop reason: HOSPADM

## 2020-09-21 RX ORDER — LIDOCAINE HYDROCHLORIDE 10 MG/ML
INJECTION, SOLUTION EPIDURAL; INFILTRATION; INTRACAUDAL; PERINEURAL AS NEEDED
Status: DISCONTINUED | OUTPATIENT
Start: 2020-09-21 | End: 2020-09-21 | Stop reason: SURG

## 2020-09-21 RX ORDER — SODIUM CHLORIDE, SODIUM LACTATE, POTASSIUM CHLORIDE, CALCIUM CHLORIDE 600; 310; 30; 20 MG/100ML; MG/100ML; MG/100ML; MG/100ML
INJECTION, SOLUTION INTRAVENOUS CONTINUOUS PRN
Status: DISCONTINUED | OUTPATIENT
Start: 2020-09-21 | End: 2020-09-21 | Stop reason: SURG

## 2020-09-21 RX ADMIN — SODIUM CHLORIDE, SODIUM LACTATE, POTASSIUM CHLORIDE, CALCIUM CHLORIDE: 600; 310; 30; 20 INJECTION, SOLUTION INTRAVENOUS at 10:15:00

## 2020-09-21 RX ADMIN — LIDOCAINE HYDROCHLORIDE 50 MG: 10 INJECTION, SOLUTION EPIDURAL; INFILTRATION; INTRACAUDAL; PERINEURAL at 10:18:00

## 2020-09-21 RX ADMIN — SODIUM CHLORIDE, SODIUM LACTATE, POTASSIUM CHLORIDE, CALCIUM CHLORIDE: 600; 310; 30; 20 INJECTION, SOLUTION INTRAVENOUS at 10:41:00

## 2020-09-21 NOTE — OPERATIVE REPORT
[facility] Endoscopy Report      Date of Procedure:  09/21/20      Preoperative Diagnosis:  1. Diarrhea and abdominal pain  2. Abnormal CT scan of the colon (colitis)  3. History of nonspecific colitis      Postoperative Diagnosis:  1. The changes extended from the maximal extent of endoscope insertion to the anal verge. The degree of inflammation was less severe in the distal sigmoid.   The changes were most extensive proximally with well-circumscribed ulcerations over 1 cm in size pres

## 2020-09-21 NOTE — INTERVAL H&P NOTE
Pre-op Diagnosis: n    The above referenced H&P was reviewed by Beba Quesada MD on 9/21/2020, the patient was examined and no significant changes have occurred in the patient's condition since the H&P was performed.   I discussed with the patient an

## 2020-09-21 NOTE — ANESTHESIA PREPROCEDURE EVALUATION
Anesthesia PreOp Note    HPI:     Robby Radford is a 48year old adult who presents for preoperative consultation requested by: Kaylie Cain MD    Date of Surgery: 9/19/2020 - 9/21/2020    Procedure(s):  COLONOSCOPY  Indication: kimberly Cano • Unspecified essential hypertension        Past Surgical History:   Procedure Laterality Date   • COLONOSCOPY  2007    Colonoscopy with biopsy   • COLONOSCOPY  3/26/2013    per NG   • HYSTERECTOMY      Supracervical hysterectomy   • LAP GASTRIC BYPASS/ROU •  morphINE sulfate (PF) 2 MG/ML injection 2 mg, 2 mg, Intravenous, Q2H PRN, Sobia Duarte MD, 2 mg at 09/21/20 5545    Or    •  morphINE sulfate (PF) 4 MG/ML injection 4 mg, 4 mg, Intravenous, Q2H PRN, Sobia Duarte MD, 4 mg at 09/20/20 1064 Substance and Sexual Activity      Alcohol use: Not Currently      Drug use: Never      Sexual activity: Not on file    Lifestyle      Physical activity        Days per week: Not on file        Minutes per session: Not on file      Stress: Not on file TempSrc: Oral  Oral Oral   SpO2: 96%  96% 95%   Weight:  76.5 kg (168 lb 11.2 oz)     Height:            Anesthesia Evaluation     Patient summary reviewed and Nursing notes reviewed    Airway   Mallampati: II  TM distance: >3 FB  Neck ROM: full  Dental

## 2020-09-21 NOTE — PLAN OF CARE
Problem: Patient Centered Care  Goal: Patient preferences are identified and integrated in the patient's plan of care  Description: Interventions:  - What would you like us to know as we care for you?   - Provide timely, complete, and accurate informatio administer replacement therapy as ordered  Outcome: Progressing     Problem: GASTROINTESTINAL - ADULT  Goal: Minimal or absence of nausea and vomiting  Description: INTERVENTIONS:  - Maintain adequate hydration with IV or PO as ordered and tolerated  - Alex as available)  - Encourage oral intake as appropriate  - Instruct patient on fluid and nutrition restrictions as appropriate  Outcome: Progressing     Problem: PAIN - ADULT  Goal: Verbalizes/displays adequate comfort level or patient's stated pain goal  Nicholas Aquino given

## 2020-09-21 NOTE — PROGRESS NOTES
[facility]    Progress Note    Mayur Ramírez Patient Status:  Inpatient    1970 MRN [id number]   Location [facility] Attending Sobia Duarte MD   Hosp Day # 2 PCP Ronaldo Child MD        Subjective:   Bernadette Malhotra 09/21/2020    K 3.6 09/21/2020     09/21/2020    CO2 27.0 09/21/2020     (H) 09/21/2020    CA 8.1 (L) 09/21/2020    ALB 2.5 (L) 09/19/2020    ALKPHO 106 (H) 09/19/2020    BILT 0.3 09/19/2020    TP 6.6 09/19/2020    AST 17 09/19/2020    ALT 12

## 2020-09-21 NOTE — PLAN OF CARE
Problem: SAFETY ADULT - FALL  Goal: Free from fall injury  Description: INTERVENTIONS:  - Assess pt frequently for physical needs  - Identify cognitive and physical deficits and behaviors that affect risk of falls.   - [place] fall precautions as indica

## 2020-09-21 NOTE — ANESTHESIA POSTPROCEDURE EVALUATION
Patient: Rina Guardado    Procedure Summary     Date: 09/21/20 Room / Location: [facility] ENDOSCOPY 04 / [facility] ENDOSCOPY    Anesthesia Start: 4151 Anesthesia Stop:     Procedure: COLONOSCOPY (N/A ) Diagnosis: (severe colitis diverticulosis)    Surgeons: Stat

## 2020-09-21 NOTE — PLAN OF CARE
Problem: SAFETY ADULT - FALL  Goal: Free from fall injury  Description: INTERVENTIONS:  - Assess pt frequently for physical needs  - Identify cognitive and physical deficits and behaviors that affect risk of falls.   - [place] fall precautions as indica

## 2020-09-22 PROCEDURE — 99233 SBSQ HOSP IP/OBS HIGH 50: CPT | Performed by: PHYSICIAN ASSISTANT

## 2020-09-22 PROCEDURE — 99233 SBSQ HOSP IP/OBS HIGH 50: CPT | Performed by: HOSPITALIST

## 2020-09-22 RX ORDER — HYDROCODONE BITARTRATE AND ACETAMINOPHEN 5; 325 MG/1; MG/1
1 TABLET ORAL EVERY 6 HOURS PRN
Status: DISCONTINUED | OUTPATIENT
Start: 2020-09-22 | End: 2020-09-26

## 2020-09-22 RX ORDER — POTASSIUM CHLORIDE 20 MEQ/1
40 TABLET, EXTENDED RELEASE ORAL EVERY 4 HOURS
Status: COMPLETED | OUTPATIENT
Start: 2020-09-22 | End: 2020-09-22

## 2020-09-22 NOTE — PROGRESS NOTES
[facility]    Progress Note    Karina Lin Patient Status:  Inpatient    1970 MRN [id number]   Location [address] [facility] Attending Otf Lares MD   Hosp Day # 3 PCP Carol Love MD        Subjective:   Edwina Quezada plan          Results:     Lab Results   Component Value Date    WBC 9.2 09/22/2020    HGB 9.4 09/22/2020    HCT 28.2 09/22/2020    .0 (H) 09/22/2020    CREATSERUM 0.78 09/22/2020    BUN 8 09/22/2020     09/22/2020    K 3.5 09/22/2020    CL 11

## 2020-09-22 NOTE — PAYOR COMM NOTE
--------------  ADMISSION REVIEW     Keisha Philippe [facility]  Subscriber #:  [id number]  Authorization Number: [id number]     Admit date: 9/19/20  Admit time: 1416       Admitting Physician: Jessy Garcia MD  Attending Physician:  Jessy Garcia MD raNITIdine HCl (ZANTAC) 150 MG Oral Tab,  Take 1 tablet (150 mg total) by mouth 2 (two) times daily. hydrochlorothiazide 25 MG Oral Tab,  Take 1 tablet (25 mg total) by mouth daily.    carvedilol 6.25 MG Oral Tab,  TAKE 1 TABLET BY MOUTH TWICE DAILY WITH auscultation  Cardiovascular: regular rate and rhythm    Gastrointestinal: soft tender l mid upper and lower abd, no guarding no peritoneal signs  Neurological: II-XII grossly intact  no focal deficits  Skin: warm and dry, no rashes.   Musculoskeletal: neck SARS-COV-2 BY PCR ()     EKG    Rate, intervals and axes as noted on EKG Report.   Rate: 88  Rhythm: Sinus Rhythm  Reading: lbbb no acute st changes           MDM           Monitor Interpretation:  s tach    Radiology Interpretation:  Ct Abdomen Pelv COMPLAINT:  Nausea, vomiting, diarrhea, and abdominal pain.      HISTORY OF PRESENT ILLNESS:  Patient is a 24-year-old female with a past medical history of ulcerative colitis and history of asthma who had come to the hospital endorsing abdominal pain that temperature 100.6, saturating 98% on room air. HEENT:  Extraocular movements are intact. Pupils equal, round, reactive to light and accommodation. Atraumatic, normocephalic. LUNGS:  Good air entry bilaterally. HEART:  S1, S2.  Regular rate and rhythm. 17:20:49  [place] [id number]  [facility] [place]/    Electronically signed by Lili Palomares MD on 9/20/2020  1:39 PM         MEDICATIONS ADMINISTERED IN LAST 1 DAY:  carvedilol (COREG) tab 6.25 mg     Date Action Dose Route User    9/22/2020 0820 Given 6.25 m Intravenous Boby Blas RN    9/22/2020 [facility] (none) Intravenous Boby Blas RN    9/21/2020 2306 New Bag (none) Intravenous Levy Montiel RN        9/19 GI CONSULT NOTE  Date of Admission:  9/19/2020  Date of Consult:  9/19/2020  PC days.     Evaluation in the ED was significant for temp 100.6,  stable BP; later temp 100.0 at 4pm.     Labs today show WBC 7500, Hgb 10.3g / mcv 73 similar to 8/27/2019; normal LFTs AST 17 ALT 12 but hypoalbuminemia albumin 2.5.     CT scan shows mo or other ?acute enteritis. She has been suffering diarrhea for 3 weeks now.   She does not recall recent antibiotic use.     Recommendations:      · Check C. difficile assay  · Clear liquid diet today; trial of low-fat diet tomorrow 9/20/2020  · Start Valeria Tucker continue to monitor  - patient did see blood in her stool       Hypokalemia  - improved,   - continue electrolyte replacement protocol.      HTN  - continue home meds  - stable     VTE ppx: hold pharm VTE in the setting of bloodystools     Greater than 35 outgoing, looks well.   HEENT: Normocephalic; pupils equally round and reactive to light; no temporal wasting; no thyromegaly or cervical lymphadenopathy  PULM: Lungs clear to auscultation anteriorly  CV: RRR  ABD: Normoactive bowel sounds; soft/nondistende exams  · Interval CT scan if pain worsens  · Heparin DVT prophylaxis  · Likely unprepped sigmoidoscopy/colonoscopy Monday, 9/21/2020 as per clinical course     9/21 GI OP REPORT  Date of Procedure:  09/21/20        Preoperative Diagnosis:  1.   Diarrhea and (36.8 °C), temperature source Oral, resp. rate 21, height 5' 2\" (1.575 m), weight 168 lb 11.2 oz (76.5 kg), SpO2 97 %, not currently breastfeeding.   Objective:  GENERAL:  Patient is lying in bed, appears to be in no acute distress at this time. Jarrod Quezada is A pulse 61, temperature 98.3 °F (36.8 °C), temperature source Oral, resp. rate 18, height 5' 2\" (1.575 m), weight 168 lb 11.2 oz (76.5 kg), SpO2 95 %, not currently breastfeeding.   Objective:  GENERAL:  Patient is lying in bed, appears to be in no acute dis 5.9 (L) 09/22/2020     AST 6 (L) 09/22/2020     ALT 9 09/22/2020

## 2020-09-22 NOTE — PLAN OF CARE
Problem: Patient Centered Care  Goal: Patient preferences are identified and integrated in the patient's plan of care  Description: Interventions:  - What would you like us to know as we care for you?  Pt has a son  - Provide timely, complete, and accurat ordered  - Initiate emergency measures for life threatening arrhythmias  - Monitor electrolytes and administer replacement therapy as ordered  Outcome: Progressing     Problem: GASTROINTESTINAL - ADULT  Goal: Minimal or absence of nausea and vomiting  Desc interventions for patient's volume status, including labs, urine output, blood pressure (other measures as available)  - Encourage oral intake as appropriate  - Instruct patient on fluid and nutrition restrictions as appropriate  Outcome: Progressing     P algorithm/standards of care as needed  Outcome: Progressing    Pt asleep. Pt denies any complaints at this time. Earlier in the shift pt requested to have IV site changed due to discomfort and pain. Writer and preceptor changed IV site.  Pt reported it was

## 2020-09-22 NOTE — PROGRESS NOTES
[facility]     Gastroenterology Progress Note    Philip Jerry Patient Status:  Inpatient    1970 MRN [id number]   Location [facility] Attending Chon Wall MD   Hosp Day # 3 PCP MD Sav Fermin as patient was asymptomatic. Her CRP has declined compared to 9/21 value, she remains on IV methylprednisolone. She overall feels stronger/better today however is very averse to discussion regarding possible Infliximab therapy in the future.  I disc 09/22/2020    CREATSERUM 0.78 09/22/2020    BUN 8 09/22/2020     09/22/2020    K 3.5 09/22/2020     09/22/2020    CO2 26.0 09/22/2020     (H) 09/22/2020    CA 7.9 (L) 09/22/2020    ALB 2.1 (L) 09/22/2020    ALKPHO 66 09/22/2020    BILT 0

## 2020-09-23 PROCEDURE — 99233 SBSQ HOSP IP/OBS HIGH 50: CPT | Performed by: PHYSICIAN ASSISTANT

## 2020-09-23 PROCEDURE — 99233 SBSQ HOSP IP/OBS HIGH 50: CPT | Performed by: INTERNAL MEDICINE

## 2020-09-23 RX ORDER — PREDNISONE 20 MG/1
20 TABLET ORAL 2 TIMES DAILY WITH MEALS
Status: DISCONTINUED | OUTPATIENT
Start: 2020-09-24 | End: 2020-09-24

## 2020-09-23 RX ORDER — PREDNISONE 20 MG/1
20 TABLET ORAL ONCE
Status: COMPLETED | OUTPATIENT
Start: 2020-09-23 | End: 2020-09-23

## 2020-09-23 NOTE — PLAN OF CARE
Problem: Patient Centered Care  Goal: Patient preferences are identified and integrated in the patient's plan of care  Description: Interventions:  - What would you like us to know as we care for you?  I have a son  - Provide timely, complete, and accurat antiarrhythmic and heart rate control medications as ordered  - Initiate emergency measures for life threatening arrhythmias  - Monitor electrolytes and administer replacement therapy as ordered  Outcome: Progressing     Problem: GASTROINTESTINAL - ADULT sodium as indicated or ordered  - Monitor response to interventions for patient's volume status, including labs, urine output, blood pressure (other measures as available)  - Encourage oral intake as appropriate  - Instruct patient on fluid and nutrition r breakdown  - Initiate interventions, skin care algorithm/standards of care as needed  Outcome: Progressing  Pt reported pain at times through shift. Pt given PO and IV pain meds for relief of abdominal pain. Pt denies any other complaints. normal...

## 2020-09-23 NOTE — PROGRESS NOTES
[facility]     Gastroenterology Progress Note    Anne Jacobs Patient Status:  Inpatient    1970 MRN [id number]   Location [facility] Attending Annelise Andrews MD   Hosp Day # 4 PCP Silvia Chino MD     Sheryl Erp 9/22 declined compared to 9/21 value, she remains on IV methylprednisolone. She overall feels improved compared to yesterday - remains averse to Infliximab therapy in the future. Reviewed with primary GI, Dr. Narda Lutz.  As she is no longer with josh ROYER  [facility] Gastroenterology  9/23/2020  Results:     Lab Results   Component Value Date    WBC 8.9 09/23/2020    HGB 9.9 09/23/2020    HCT 30.2 09/23/2020    .0 (H) 09/23/2020    CREATSERUM 0.81 09/23/2020    BUN 9 09/23/2020     09

## 2020-09-23 NOTE — PLAN OF CARE
Problem: Patient Centered Care  Goal: Patient preferences are identified and integrated in the patient's plan of care  Description: Interventions:  - What would you like us to know as we care for you?   - Provide timely, complete, and accurate informatio administer replacement therapy as ordered  Outcome: Progressing     Problem: GASTROINTESTINAL - ADULT  Goal: Minimal or absence of nausea and vomiting  Description: INTERVENTIONS:  - Maintain adequate hydration with IV or PO as ordered and tolerated  - Alex as available)  - Encourage oral intake as appropriate  - Instruct patient on fluid and nutrition restrictions as appropriate  Outcome: Progressing     Problem: PAIN - ADULT  Goal: Verbalizes/displays adequate comfort level or patient's stated pain goal  Gavin Tyler

## 2020-09-24 PROCEDURE — 99233 SBSQ HOSP IP/OBS HIGH 50: CPT | Performed by: INTERNAL MEDICINE

## 2020-09-24 RX ORDER — PREDNISONE 20 MG/1
20 TABLET ORAL 2 TIMES DAILY WITH MEALS
Qty: 10 TABLET | Refills: 0 | Status: SHIPPED | OUTPATIENT
Start: 2020-09-24 | End: 2020-09-29

## 2020-09-24 RX ORDER — HYDROCODONE BITARTRATE AND ACETAMINOPHEN 5; 325 MG/1; MG/1
1 TABLET ORAL EVERY 6 HOURS PRN
Qty: 12 TABLET | Refills: 0 | Status: SHIPPED | OUTPATIENT
Start: 2020-09-24 | End: 2020-09-26

## 2020-09-24 RX ORDER — METHYLPREDNISOLONE SODIUM SUCCINATE 40 MG/ML
20 INJECTION, POWDER, LYOPHILIZED, FOR SOLUTION INTRAMUSCULAR; INTRAVENOUS EVERY 8 HOURS
Status: COMPLETED | OUTPATIENT
Start: 2020-09-24 | End: 2020-09-26

## 2020-09-24 NOTE — PROGRESS NOTES
[facility]    Progress Note    Leoncio Davidson Patient Status:  Inpatient    1970 MRN [id number]   Location [address] [facility] Attending Jennifer Moulton MD   Hosp Day # 5 PCP Calli Judd MD       Subjective:   No acute ev Subcutaneous Q12H [facility]       Current PRN Inpatient Meds:      HYDROcodone-acetaminophen, Normal Saline Flush, morphINE sulfate **OR** morphINE sulfate **OR** morphINE sulfate, Albuterol Sulfate HFA, acetaminophen    Results:     Recent Labs   Lab 09/21/20 follow-up pelvic ultrasound recommended to further characterize. Multiple other incidental findings as described in the body of the report.     Dictated by (CST): Maira Wesley MD on 9/19/2020 at 11:44 AM     Finalized by (CST): Maira Wesley MD on 9/19/2020

## 2020-09-24 NOTE — PLAN OF CARE
Patient doing well today. Norco given PRN for pain control. Per GI, no plans for discharge today. Will re-evaluate tomorrow. Will continue to monitor.      Problem: Patient Centered Care  Goal: Patient preferences are identified and integrated in the patien cardiac monitoring, monitor vital signs, obtain 12 lead EKG if indicated  - Evaluate effectiveness of antiarrhythmic and heart rate control medications as ordered  - Initiate emergency measures for life threatening arrhythmias  - Monitor electrolytes and a Monitor intake, output and patient weight  - Monitor urine specific gravity, serum osmolarity and serum sodium as indicated or ordered  - Monitor response to interventions for patient's volume status, including labs, urine output, blood pressure (other venessa ulcer development  - Assess and document skin integrity  - Monitor for areas of redness and/or skin breakdown  - Initiate interventions, skin care algorithm/standards of care as needed  Outcome: Progressing

## 2020-09-24 NOTE — PLAN OF CARE
Problem: Patient Centered Care  Goal: Patient preferences are identified and integrated in the patient's plan of care  Description: Interventions:- Provide timely, complete, and accurate information to patient/family  - Incorporate patient and family kno tolerated, if ordered  - Obtain nutritional consult as needed  - Evaluate fluid balance  Outcome: Progressing  Goal: Maintains or returns to baseline bowel function  Description: INTERVENTIONS:  - Assess bowel function  - Maintain adequate hydration with I evaluate response  - Implement non-pharmacological measures as appropriate and evaluate response  - Consider cultural and social influences on pain and pain management  - Manage/alleviate anxiety  - Utilize distraction and/or relaxation techniques  - Monit

## 2020-09-24 NOTE — PROGRESS NOTES
[facility]     Gastroenterology Progress Note    Mac Chadwick Patient Status:  Inpatient    1970 MRN [id number]   Location [facility] Attending Stanley Robles MD   Hosp Day # 5 PCP Sabi Porras MD Mucous membranes are moist.  Cardiovascular: Regular rate and rhythm. Not tachycardic. Lung: Clear to auscultation bilaterally  Abdomen:Non-distended. Bowel sounds are present and active.   There is mild generalized tenderness to palpation throughout the MCHC      31.0 - 37.0 g/dL 32.8     RDW-SD      35.1 - 46.3 fL 42.2     RDW      11.0 - 15.0 % 16.6 (H)     Platelet Count      836.5 - 450.0 10(3)uL 493.0 (H)     Prelim Neutrophil Abs      1.50 - 7.70 x10 (3) uL 6.28     Neutrophils Absolute      1.50 Normal.  No significant pulmonary parenchymal abnormalities. No effusion or pleural thickening. BONES:             Redemonstration of resection of the posterior left fifth rib with deformity of the posterolateral left fourth and sixth ribs.   OTHER:

## 2020-09-24 NOTE — PAYOR COMM NOTE
--------------  CONTINUED STAY REVIEW    [facility]  [facility] #:  [id number]  Authorization Number: [id number]     Admit date: 9/19/20  Admit time: 1416    Admitting Physician: Javi Khan MD  Attending Physician:  Vickie Melton, history includes 2013 colonoscopy with focal patchy colitis of ascending colon, splenic flexure and sigmoid colon suggestive of IBD, however treatment was not prescribed as patient was asymptomatic.      Plan:  Assessment and Plan:     Acute colitis  - pat

## 2020-09-25 PROCEDURE — 99233 SBSQ HOSP IP/OBS HIGH 50: CPT | Performed by: HOSPITALIST

## 2020-09-25 PROCEDURE — 99232 SBSQ HOSP IP/OBS MODERATE 35: CPT | Performed by: INTERNAL MEDICINE

## 2020-09-25 RX ORDER — PREDNISONE 20 MG/1
40 TABLET ORAL
Status: DISCONTINUED | OUTPATIENT
Start: 2020-09-26 | End: 2020-09-26

## 2020-09-25 NOTE — PLAN OF CARE
Problem: GASTROINTESTINAL - ADULT  Goal: Maintains or returns to baseline bowel function  Description: INTERVENTIONS:  - Assess bowel function  - Maintain adequate hydration with IV or PO as ordered and tolerated  - Evaluate effectiveness of GI medicatio

## 2020-09-25 NOTE — PROGRESS NOTES
[facility]     Gastroenterology Progress Note    Nadira Acevedo Patient Status:  Inpatient    1970 MRN [id number]   Location [facility] Attending Matt Lemons MD   Hosp Day # 6 PCP Jose E Casiano MD       Assess kg/m². General: Patient appears comfortable and in no acute distress. Not toxic. HEENT:Negative for scleral icterus. Mucous membranes are moist.  Cardiovascular: Regular rate and rhythm. Not tachycardic.   Lung: Clear to auscultation bilaterally  Abd

## 2020-09-25 NOTE — PLAN OF CARE
Problem: Patient Centered Care  Goal: Patient preferences are identified and integrated in the patient's plan of care  Description: Interventions:  - What would you like us to know as we care for you?  Patient is independent  - Provide timely, complete, a ordered  - Initiate emergency measures for life threatening arrhythmias  - Monitor electrolytes and administer replacement therapy as ordered  Outcome: Progressing     Problem: GASTROINTESTINAL - ADULT  Goal: Minimal or absence of nausea and vomiting  Desc interventions for patient's volume status, including labs, urine output, blood pressure (other measures as available)  - Encourage oral intake as appropriate  - Instruct patient on fluid and nutrition restrictions as appropriate  Outcome: Progressing     P effects  - Notify MD/LIP if interventions unsuccessful or patient reports new pain  - Anticipate increased pain with activity and pre-medicate as appropriate  Outcome: Progressing   Patient awake and alertx4, v/s wnl, pain reported medication given as orde

## 2020-09-25 NOTE — PROGRESS NOTES
[facility]    Progress Note    Yasmani Francois Patient Status:  Inpatient    1970 MRN [id number]   Location [facility] Attending Florence Etienne MD   Hosp Day # 6 PCP Cruz Gomez MD       Subjective:   No acute events ov morphINE sulfate **OR** morphINE sulfate **OR** morphINE sulfate, Albuterol Sulfate HFA, acetaminophen    Results:     Recent Labs   Lab 09/22/20  0640 09/23/20  0739 09/25/20  0659   RBC 3.90 4.13 4.30   HGB 9.4 9.9 10.6   HCT 28.2 30.2 32.0   MCV 72.3 73 incidental findings as described in the body of the report.     Dictated by (CST): Michel Marquez MD on 9/19/2020 at 11:44 AM     Finalized by (CST): Michel Marquez MD on 9/19/2020 at 11:55 AM                   Lab Results   Component Value Date    CRP 2.18 (H)

## 2020-09-26 VITALS
RESPIRATION RATE: 16 BRPM | WEIGHT: 168.69 LBS | DIASTOLIC BLOOD PRESSURE: 81 MMHG | OXYGEN SATURATION: 98 % | BODY MASS INDEX: 31.04 KG/M2 | HEIGHT: 62 IN | SYSTOLIC BLOOD PRESSURE: 125 MMHG | TEMPERATURE: 100 F | HEART RATE: 91 BPM

## 2020-09-26 PROBLEM — E87.6 HYPOKALEMIA: Status: RESOLVED | Noted: 2020-09-19 | Resolved: 2020-09-26

## 2020-09-26 PROCEDURE — 99239 HOSP IP/OBS DSCHRG MGMT >30: CPT | Performed by: HOSPITALIST

## 2020-09-26 RX ORDER — HYDROCODONE BITARTRATE AND ACETAMINOPHEN 5; 325 MG/1; MG/1
1 TABLET ORAL EVERY 6 HOURS PRN
Qty: 6 TABLET | Refills: 0 | Status: SHIPPED | OUTPATIENT
Start: 2020-09-26 | End: 2020-10-22

## 2020-09-26 RX ORDER — PREDNISONE 20 MG/1
20 TABLET ORAL 2 TIMES DAILY
Qty: 60 TABLET | Refills: 0 | Status: ON HOLD | OUTPATIENT
Start: 2020-09-26 | End: 2020-10-08

## 2020-09-26 NOTE — DISCHARGE SUMMARY
[facility]    Discharge Summary    [address] [facility] Patient Status:  Inpatient    1970 MRN [id number]   Location [address] [facility] Attending Erick Quezada MD   Hosp Day # 7 PCP Srinivasan Ennis MD     Date of Admission: 2020 morbid obesity  - s/p laparoscopic gastric bypass bariatric surgery with Александр Buenrostro and Manjit on 4/11/2016  - BMI currently down to 31 from 41 prior to the surgery    Dispo: home      Physical Examination:  Vital Signs:  Blood pressure 111/77, pulse 64, te 09/23/2020    CA 8.6 09/23/2020    ALB 2.1 (L) 09/22/2020    ALKPHO 66 09/22/2020    BILT 0.2 09/22/2020    TP 5.9 (L) 09/22/2020    AST 6 (L) 09/22/2020    ALT 9 09/22/2020    TSH 2.76 11/21/2018    LIP 85 09/19/2020    ESRML 32 09/25/2020    CRP 2.18 (H) by mouth 2 (two) times daily.    Stop taking on: October 26, 2020  Quantity: 60 tablet  Refills: 0        CONTINUE taking these medications      Instructions Prescription details   Albuterol Sulfate  (90 Base) MCG/ACT Aers  Commonly known as: Provent

## 2020-09-26 NOTE — PLAN OF CARE
Problem: Patient Centered Care  Goal: Patient preferences are identified and integrated in the patient's plan of care  Description: Interventions:  - What would you like us to know as we care for you?  Patient is independent  - Provide timely, complete, a Initiate emergency measures for life threatening arrhythmias  - Monitor electrolytes and administer replacement therapy as ordered  Outcome: Completed     Problem: GASTROINTESTINAL - ADULT  Goal: Minimal or absence of nausea and vomiting  Description: INTE patient's volume status, including labs, urine output, blood pressure (other measures as available)  - Encourage oral intake as appropriate  - Instruct patient on fluid and nutrition restrictions as appropriate  Outcome: Completed     Problem: PAIN - ADULT needed  Outcome: Completed   Patient discharged

## 2020-09-26 NOTE — CM/SW NOTE
09/26/20 0900   Discharge disposition   Expected discharge disposition Home or Self   Name of [id number] [facility]

## 2020-09-27 NOTE — PAYOR COMM NOTE
--------------  DISCHARGE REVIEW    Payor: [facility]  Subscriber #:  [id number]  Authorization Number: [id number]     Admit date: 9/19/20  Admit time:  1416  Discharge Date: 9/26/2020 10:54 AM     Admitting Physician: Gemini Hutchison MD  Attending into the sigmoid colon and rectum. Patient was also found to be hypokalemic. GI was placed on consult.   Patient was started on IV antibiotics and IV Solu-Medrol and was admitted to the hospital.    Hospital Course:     Acute colitis  - patient has a hx o No obstruction. 4.9 cm left adnexal cyst.  Nonemergent follow-up pelvic ultrasound recommended to further characterize. Multiple other incidental findings as described in the body of the report.     Dictated by (CST): Maira Wesley MD on 9/19/2020 at 11:44 displayed.      No results found for: PT, INR    Discharge Medications:      Discharge Medications      START taking these medications      Instructions Prescription details   HYDROcodone-acetaminophen 5-325 MG Tabs  Commonly known as: NORCO      Take 1 tab MG Tabs  · predniSONE 20 MG Tabs         Follow up Visits  Jez Lopez MD  [address]  [phone]    In 1 week        Consultants     Provider Role Specialty    David Escalona MD Consulting Physician  [facility] ABBY

## 2020-09-29 ENCOUNTER — TELEPHONE (OUTPATIENT)
Dept: GASTROENTEROLOGY | Facility: CLINIC | Age: 50
End: 2020-09-29

## 2020-09-29 NOTE — TELEPHONE ENCOUNTER
Patient's  called in to schedule appt for the patient as soon as possible. He states the patient was discharged from the hospital last Saturday and needs to be seen within the week.  I offered RN visit but the patient's  declined stating that

## 2020-09-29 NOTE — TELEPHONE ENCOUNTER
Patient states Dr. Lisa Robles advised her to make a f/u appointment with the doctor only. No appointments available. Please advise when you want to add patient on. Thank you.       Operative Report signed by Taco Cooper MD at 9/21/2020 10:55 palpable intraluminal abnormalities. An Olympus variable stiffness 190 series HD pediatric colonoscope (with CO2 insufflation) was inserted into the rectum and advanced under direct vision by following the lumen to the splenic flexure.   The colon was exam

## 2020-09-29 NOTE — TELEPHONE ENCOUNTER
Patient contacted and message given  from Dr. Franny Gallagher to  (on HIPAA). Appointment made for 10/01/2020 at 8:30 am.  Patient advised to come 15 minutes early. Address given.  voiced understanding.

## 2020-10-01 ENCOUNTER — HOSPITAL ENCOUNTER (INPATIENT)
Facility: HOSPITAL | Age: 50
LOS: 7 days | Discharge: HOME | DRG: 386 | End: 2020-10-08
Attending: INTERNAL MEDICINE | Admitting: INTERNAL MEDICINE
Payer: COMMERCIAL

## 2020-10-01 ENCOUNTER — OFFICE VISIT (OUTPATIENT)
Dept: GASTROENTEROLOGY | Facility: CLINIC | Age: 50
End: 2020-10-01
Payer: COMMERCIAL

## 2020-10-01 VITALS
BODY MASS INDEX: 28.71 KG/M2 | SYSTOLIC BLOOD PRESSURE: 95 MMHG | HEART RATE: 114 BPM | HEIGHT: 62 IN | DIASTOLIC BLOOD PRESSURE: 67 MMHG | WEIGHT: 156 LBS

## 2020-10-01 DIAGNOSIS — K51.00 ULCERATIVE PANCOLITIS WITHOUT COMPLICATION (HCC): Primary | ICD-10-CM

## 2020-10-01 PROBLEM — K51.90 EXACERBATION OF ULCERATIVE COLITIS (HCC): Status: ACTIVE | Noted: 2020-10-01

## 2020-10-01 PROCEDURE — 3008F BODY MASS INDEX DOCD: CPT | Performed by: INTERNAL MEDICINE

## 2020-10-01 PROCEDURE — 99214 OFFICE O/P EST MOD 30 MIN: CPT | Performed by: INTERNAL MEDICINE

## 2020-10-01 PROCEDURE — 3074F SYST BP LT 130 MM HG: CPT | Performed by: HOSPITALIST

## 2020-10-01 PROCEDURE — 3078F DIAST BP <80 MM HG: CPT | Performed by: INTERNAL MEDICINE

## 2020-10-01 PROCEDURE — 3074F SYST BP LT 130 MM HG: CPT | Performed by: INTERNAL MEDICINE

## 2020-10-01 PROCEDURE — 1111F DSCHRG MED/CURRENT MED MERGE: CPT | Performed by: INTERNAL MEDICINE

## 2020-10-01 PROCEDURE — 3079F DIAST BP 80-89 MM HG: CPT | Performed by: HOSPITALIST

## 2020-10-01 PROCEDURE — 99223 1ST HOSP IP/OBS HIGH 75: CPT | Performed by: HOSPITALIST

## 2020-10-01 RX ORDER — SODIUM CHLORIDE 9 MG/ML
INJECTION, SOLUTION INTRAVENOUS CONTINUOUS
Status: DISCONTINUED | OUTPATIENT
Start: 2020-10-01 | End: 2020-10-05

## 2020-10-01 RX ORDER — CARVEDILOL 6.25 MG/1
6.25 TABLET ORAL 2 TIMES DAILY WITH MEALS
Status: DISCONTINUED | OUTPATIENT
Start: 2020-10-01 | End: 2020-10-08

## 2020-10-01 RX ORDER — SODIUM CHLORIDE 0.9 % (FLUSH) 0.9 %
3 SYRINGE (ML) INJECTION AS NEEDED
Status: DISCONTINUED | OUTPATIENT
Start: 2020-10-01 | End: 2020-10-08

## 2020-10-01 RX ORDER — MELATONIN
325 2 TIMES DAILY
Status: DISCONTINUED | OUTPATIENT
Start: 2020-10-01 | End: 2020-10-08

## 2020-10-01 RX ORDER — PANTOPRAZOLE SODIUM 40 MG/1
40 TABLET, DELAYED RELEASE ORAL
Status: DISCONTINUED | OUTPATIENT
Start: 2020-10-02 | End: 2020-10-05

## 2020-10-01 RX ORDER — ALBUTEROL SULFATE 90 UG/1
2 AEROSOL, METERED RESPIRATORY (INHALATION) EVERY 6 HOURS PRN
Status: DISCONTINUED | OUTPATIENT
Start: 2020-10-01 | End: 2020-10-08

## 2020-10-01 RX ORDER — HYDROCHLOROTHIAZIDE 25 MG/1
25 TABLET ORAL DAILY
Status: DISCONTINUED | OUTPATIENT
Start: 2020-10-01 | End: 2020-10-08

## 2020-10-01 RX ORDER — HYDROCODONE BITARTRATE AND ACETAMINOPHEN 5; 325 MG/1; MG/1
1 TABLET ORAL EVERY 6 HOURS PRN
Status: DISCONTINUED | OUTPATIENT
Start: 2020-10-01 | End: 2020-10-08

## 2020-10-01 RX ORDER — PANTOPRAZOLE SODIUM 20 MG/1
20 TABLET, DELAYED RELEASE ORAL
Status: ON HOLD | COMMUNITY
End: 2020-10-08

## 2020-10-01 RX ORDER — METHYLPREDNISOLONE SODIUM SUCCINATE 40 MG/ML
20 INJECTION, POWDER, LYOPHILIZED, FOR SOLUTION INTRAMUSCULAR; INTRAVENOUS EVERY 8 HOURS
Status: DISCONTINUED | OUTPATIENT
Start: 2020-10-01 | End: 2020-10-08

## 2020-10-01 RX ORDER — ACETAMINOPHEN 325 MG/1
650 TABLET ORAL EVERY 6 HOURS PRN
Status: DISCONTINUED | OUTPATIENT
Start: 2020-10-01 | End: 2020-10-08

## 2020-10-01 NOTE — PROGRESS NOTES
HPI:    Patient ID: Darby Garcia is a 48year old adult. HPI  Osman Grande returns in hospital follow-up today along with her  Ryan Barbosa. She was discharged 5 days prior.     As per previous notes the patient has a history of a nonspecific colitis stool frequency may be \"a little better\", however, she has lost the ability to pass \"dry gas\".         Review of Systems  See above    Wt Readings from Last 6 Encounters:  10/01/20 : 156 lb (70.8 kg)  09/21/20 : 168 lb 11.2 oz (76.5 kg)  09/10/19 : 176 Tachycardic at 116 by nursing  Heart rate decreases to 96 supine at rest   Pulmonary/Chest: Effort normal and breath sounds normal. No respiratory distress. She has no wheezes. She has no rales. Abdominal: Soft.  Bowel sounds are normal. She exhibits no rescue therapy.       Meds This Visit:  Requested Prescriptions      No prescriptions requested or ordered in this encounter       Imaging & Referrals:  None       #[id number]

## 2020-10-01 NOTE — PLAN OF CARE
Problem: Patient Centered Care  Goal: Patient preferences are identified and integrated in the patient's plan of care  Description: Interventions:  - What would you like us to know as we care for you?   - Provide timely, complete, and accurate informatio neutropenic period  Description: INTERVENTIONS  - Monitor WBC  - Administer growth factors as ordered  - Implement neutropenic guidelines  Outcome: Progressing     Problem: SAFETY ADULT - FALL  Goal: Free from fall injury  Description: INTERVENTIONS:  - As effectiveness of ordered antiemetic medications  - Provide nonpharmacologic comfort measures as appropriate  - Advance diet as tolerated, if ordered  - Obtain nutritional consult as needed  - Evaluate fluid balance  Outcome: Progressing  Goal: Maintains or

## 2020-10-01 NOTE — CONSULTS
[facility]   Gastroenterology Consultation Note    Robby Kilopritesh  Patient Status:    Inpatient  Date of Admission:         10/1/2020, Hospital day #0  Attending:   Kaylie Cain MD  PCP:     Sandra Hartley MD    Reason for Hx:  - No known history of esophageal, gastric or colon cancers  + niece with Crohn's Disease    Social Hx:  - No tobacco use/No ETOH  - Denies illicit drug use  + lives with      History:  Past Medical History:   Diagnosis Date   • Asthma    • Asth diplopia or change in vision   RESPIRATORY:  negative for severe shortness of breath  CARDIOVASCULAR:  negative for crushing sub-sternal chest pain  GASTROINTESTINAL:  see HPI  GENITOURINARY:  negative for dysuria or gross hematuria  INTEGUMENT/BREAST:  [facility] and the patient was recommended for direct admission d/t persistent loose stools, abdominal pain/cramping and failure to improve on PO steroids. Please see Dr. Puja Thompson' evaluation from today in the clinic for complete details.     On last adm

## 2020-10-01 NOTE — H&P
[facility]    PATIENT'S NAME: Renae Mcguire   ATTENDING PHYSICIAN: Anuj Bruno MD   PATIENT ACCOUNT#:   [id number]    LOCATION:  [facility] RECORD #:   [id number]       YOB: 1970  ADMISSION DATE:       10/01 Please refer to the patient's chart for a detailed review regarding patient's home medications. ALLERGIES:  Seasonal allergies.      FAMILY HISTORY:  Positive for mother had history of bone cancer, father had history of dementia, and sister has a history Venous thromboembolism prophylaxis: We will currently hold pharmacologic DVT prophylaxis as patient recently had rectal bleeding. 7.   Disposition: At this time, we will continue to monitor closely. Patient is a Full Code.   Appreciate recommendations

## 2020-10-02 PROCEDURE — 99233 SBSQ HOSP IP/OBS HIGH 50: CPT | Performed by: PHYSICIAN ASSISTANT

## 2020-10-02 PROCEDURE — 99233 SBSQ HOSP IP/OBS HIGH 50: CPT | Performed by: HOSPITALIST

## 2020-10-02 RX ORDER — POTASSIUM CHLORIDE 20 MEQ/1
40 TABLET, EXTENDED RELEASE ORAL EVERY 4 HOURS
Status: COMPLETED | OUTPATIENT
Start: 2020-10-02 | End: 2020-10-02

## 2020-10-02 RX ORDER — ONDANSETRON 2 MG/ML
4 INJECTION INTRAMUSCULAR; INTRAVENOUS EVERY 4 HOURS PRN
Status: DISCONTINUED | OUTPATIENT
Start: 2020-10-02 | End: 2020-10-08

## 2020-10-02 NOTE — PLAN OF CARE
Mild pain, declined medication. Cont'd IV solu medrol. Loose stools, with red streaks, C-diff negative. Vitals stable. Problem: Patient Centered Care  Goal: Patient preferences are identified and integrated in the patient's plan of care  Description:  In as indicated by assessment.  - Educate pt/family on patient safety including physical limitations  - Instruct pt to call for assistance with activity based on assessment  - Modify environment to reduce risk of injury  - Provide assistive devices as appropr tolerated  - Evaluate effectiveness of GI medications  - Encourage mobilization and activity  - Obtain nutritional consult as needed  - Establish a toileting routine/schedule  - Consider collaborating with pharmacy to review patient's medication profile  O

## 2020-10-02 NOTE — DIETARY NOTE
ADULT NUTRITION INITIAL ASSESSMENT    Pt is at high nutrition risk. Pt meets severe malnutrition criteria.       CRITERIA FOR MALNUTRITION DIAGNOSIS:  Criteria for severe malnutrition diagnosis: acute illness/injury related to wt loss greater than 5% in 1 mild intermittent    • Fibroids     Hysterectomy    • H/O mumps    • H/O pregnancy , , ?, ?     x 2-, ; - induced x 2   • Hemorrhoids     Colonoscopy with biopsy    • History of chicken pox    • Lipid screening 2011 8.5 8.0*   MG  --  2.5   * 134*   K 3.4* 3.6   CL 97* 100   CO2 30.0 26.0   OSMOCALC 276 280     NUTRITION RELATED PHYSICAL FINDINGS:  - Body Fat/Muscle Mass: No wasting noted, malnutrition related to PO intake and wt loss per visual exam.  - Fluid A

## 2020-10-02 NOTE — PROGRESS NOTES
Jo[facility]     Gastroenterology Progress Note    Genetta Sickle Patient Status:  Inpatient    1970 MRN [id number]   Location [facility]5 [facility] Attending Alex Meade MD   Hosp Day # 1 PCP MD Kristofer Rordiguez and the patient was recommended for direct admission d/t persistent loose stools, abdominal pain/cramping and failure to improve on PO steroids.     On last admission, patient had some symptomatic improvement with IV steroids and she was averse to trialing

## 2020-10-02 NOTE — PAYOR COMM NOTE
--------------  ADMISSION REVIEW     Payor: ALLIED BENEFIT  Subscriber #:  [id number]  Authorization Number: [id number]    Admit date: 10/1/20  Admit time: 26       Admitting Physician: Viji Veloz MD  Attending Physician:  MD FLORA Lazo having mild abdominal pain. She says she has been using Tylenol for pain and tries to avoid the Norco.  Currently, denies any fevers, chills, headaches, blurred vision, or palpitations. She is endorsing nausea.   She states that the rectal bleeding is als been admitted to the hospital with worsening ulcerative colitis. 1.   Ulcerative colitis:  At this time, patient has been restarted on IV Solu-Medrol. Gastroenterology has been placed on consult. Appreciate recommendations.   We will continue to monit 0513 Rate/Dose Change (none) Intravenous [place] Clappjaquelin, RN    10/2/2020 0935 Rate/Dose Change (none) Intravenous [place] Clappjaquelin, RN    10/2/2020 0920 Rate/Dose Change (none) Intravenous [place] Clappjaquelin, RN    10/2/2020 8995 Rate/Dose Change (none) Intra unprepped flexible sigmoidoscopy c/w L sided colitis and colonic diverticulosis (PATH: +moderate active colitis, cryptitis and crypt abscess formation; NO dysplasia/carcinoma).  She was seen in the GI clinic and the patient was recommended for direct admiss 11.2 10/01/2020     HCT 34.3 10/01/2020     .0 10/01/2020         Imaging:  No results found.     Assessment & Plan   Jewels Ramirez is a pleasant 48year old  patient with PMHx of morbid obesity (s/p gastric bypass surgery 4/20 however RN reports some \"pink tinge\"  1 semi-formed BM this AM   Tolerated diet well last night and breakfast this morning   Had an episode of vomiting after dinner, no vomiting or nausea this morning  Abdominal cramping persists - does not request pain c/b requirement to resume IV methylprednisolone from PO steroids. Monitor clinical course.      Quantiferon TB (-) on last admission, hepatic viral serologies unremarkable (elective vaccinations recommended for Hep A/B).  Infliximab induction this morning w

## 2020-10-03 PROCEDURE — 99232 SBSQ HOSP IP/OBS MODERATE 35: CPT | Performed by: INTERNAL MEDICINE

## 2020-10-03 PROCEDURE — 99233 SBSQ HOSP IP/OBS HIGH 50: CPT | Performed by: HOSPITALIST

## 2020-10-03 RX ORDER — ENOXAPARIN SODIUM 100 MG/ML
40 INJECTION SUBCUTANEOUS DAILY
Status: DISCONTINUED | OUTPATIENT
Start: 2020-10-03 | End: 2020-10-08

## 2020-10-03 NOTE — PLAN OF CARE
Alert and oriented x4. Ambulating freely, patient is tolerating low-fiber diet well. 2 bowel movements today, soft-formed. Abdominal pain 3/10, no nausea/vomiting episodes.      Problem: Patient Centered Care  Goal: Patient preferences are identified and in increased pain with activity and pre-medicate as appropriate  Outcome: Progressing     Problem: RISK FOR INFECTION - ADULT  Goal: Absence of fever/infection during anticipated neutropenic period  Description: INTERVENTIONS  - Monitor WBC  - Administer grow vomiting  Description: INTERVENTIONS:  - Maintain adequate hydration with IV or PO as ordered and tolerated  - Nasogastric tube to low intermittent suction as ordered  - Evaluate effectiveness of ordered antiemetic medications  - Provide nonpharmacologic c

## 2020-10-03 NOTE — PLAN OF CARE
Pain increases with BM; declined pain medication. One episode of emesis d/t nausea. Zofran given. Pt aware of POC, no further concerns at this time.      Problem: Patient Centered Care  Goal: Patient preferences are identified and integrated in the patient' and pre-medicate as appropriate  Outcome: Progressing     Problem: RISK FOR INFECTION - ADULT  Goal: Absence of fever/infection during anticipated neutropenic period  Description: INTERVENTIONS  - Monitor WBC  - Administer growth factors as ordered  - Impl INTERVENTIONS:  - Maintain adequate hydration with IV or PO as ordered and tolerated  - Nasogastric tube to low intermittent suction as ordered  - Evaluate effectiveness of ordered antiemetic medications  - Provide nonpharmacologic comfort measures as appr

## 2020-10-03 NOTE — PROGRESS NOTES
João Delgado [facility]     Gastroenterology Progress Note    Virgia Sacks Patient Status:  Inpatient    1970 MRN [id number]   Location [facility]5 [facility] Attending Franny Fried MD   Hosp Day # 2 PCP MD Francia Cee 5mg/kg on 10/2. SARS-COV-2 negative, CRP is elevated compared to most recent, Hgb is stable. 10/3 - first day after infliximab, some forming stools, but still frequent. Gas and ab pains, but abdomen benign.      Recommend:  -infliximab therapy initiated

## 2020-10-03 NOTE — PROGRESS NOTES
[facility]    Progress Note    Sergo Lopez Patient Status:  Inpatient    1970 MRN [id number]   Location [facility]5 [facility] Attending Jeannie Antony MD   Hosp Day # 2 PCP Tod Zhong MD        Subjective:   Meli Jean BUN 14 10/03/2020     10/03/2020    K 4.1 10/03/2020     10/03/2020    CO2 24.0 10/03/2020     (H) 10/03/2020    CA 8.3 (L) 10/03/2020    ALB 2.6 (L) 10/01/2020    ALKPHO 60 10/01/2020    BILT 0.3 10/01/2020    TP 6.9 10/01/2020    AST 1

## 2020-10-04 PROCEDURE — 99233 SBSQ HOSP IP/OBS HIGH 50: CPT | Performed by: HOSPITALIST

## 2020-10-04 PROCEDURE — 99232 SBSQ HOSP IP/OBS MODERATE 35: CPT | Performed by: INTERNAL MEDICINE

## 2020-10-04 NOTE — PROGRESS NOTES
João Delgado [facility]     Gastroenterology Progress Note    Shine Roberts Patient Status:  Inpatient    1970 MRN [id number]   Location [facility]5 [facility] Attending Dixon Best MD   Hosp Day # 3 PCP Lan Coral, MD Joice Boxer CRP is elevated compared to most recent, Hgb is stable. 10/4 - slow improvement. More anemic, will check iron studies. D/w patient and . May required another 1-2 days before seeing improvement.      Recommend:  -infliximab therapy initiated 10/2

## 2020-10-04 NOTE — PLAN OF CARE
Alert and oriented x4. Ambulating freely. Nausea and one vomiting episode this morning. 3 bowel movements today. Soft, formed stools. No evidence of blood of melena. IV solu-medrol given. IV fluids infusing.     Problem: Patient Centered Care  Goal: Patient reports new pain  - Anticipate increased pain with activity and pre-medicate as appropriate  Outcome: Progressing     Problem: RISK FOR INFECTION - ADULT  Goal: Absence of fever/infection during anticipated neutropenic period  Description: INTERVENTIONS  - absence of nausea and vomiting  Description: INTERVENTIONS:  - Maintain adequate hydration with IV or PO as ordered and tolerated  - Nasogastric tube to low intermittent suction as ordered  - Evaluate effectiveness of ordered antiemetic medications  - Prov

## 2020-10-04 NOTE — PLAN OF CARE
Problem: Patient Centered Care  Goal: Patient preferences are identified and integrated in the patient's plan of care  Description: Interventions:  - What would you like us to know as we care for you?   - Provide timely, complete, and accurate informatio techniques  - Monitor for opioid side effects  - Notify MD/LIP if interventions unsuccessful or patient reports new pain  - Anticipate increased pain with activity and pre-medicate as appropriate  10/4/2020 0255 by Martina Pineda RN  Outcome: Progressin form as appropriate  - Assess patient's ability to be responsible for managing their own health  - Refer to Case Management Department for coordinating discharge planning if the patient needs post-hospital services based on physician/LIP order or complex n moisture  - Encourage food from home; allow for food preferences  - Enhance eating environment  10/4/2020 0255 by Larena Brunner, RN  Outcome: Progressing  10/4/2020 0255 by Larena Brunner, RN  Outcome: Progressing  Goal: Achieves appropriate nutrition

## 2020-10-04 NOTE — PROGRESS NOTES
[facility]    Progress Note    Sergo Lopez Patient Status:  Inpatient    1970 MRN [id number]   Location [address] [facility] Attending Jeannie Antony MD   Hosp Day # 3 PCP Tod Zhong MD        Subjective:   Meli Jean HCT 27.0 10/04/2020    .0 10/04/2020    CREATSERUM 0.88 10/04/2020    BUN 14 10/04/2020     10/04/2020    K 3.8 10/04/2020     10/04/2020    CO2 27.0 10/04/2020     (H) 10/04/2020    CA 8.2 (L) 10/04/2020    ALB 2.6 (L) 10/01/2020

## 2020-10-05 PROCEDURE — 99232 SBSQ HOSP IP/OBS MODERATE 35: CPT | Performed by: INTERNAL MEDICINE

## 2020-10-05 PROCEDURE — 99232 SBSQ HOSP IP/OBS MODERATE 35: CPT | Performed by: HOSPITALIST

## 2020-10-05 RX ORDER — POTASSIUM CHLORIDE 20 MEQ/1
40 TABLET, EXTENDED RELEASE ORAL ONCE
Status: COMPLETED | OUTPATIENT
Start: 2020-10-05 | End: 2020-10-05

## 2020-10-05 RX ORDER — FAMOTIDINE 20 MG/1
20 TABLET ORAL NIGHTLY
Status: DISCONTINUED | OUTPATIENT
Start: 2020-10-05 | End: 2020-10-08

## 2020-10-05 RX ORDER — PANTOPRAZOLE SODIUM 40 MG/1
40 TABLET, DELAYED RELEASE ORAL
Status: DISCONTINUED | OUTPATIENT
Start: 2020-10-05 | End: 2020-10-08

## 2020-10-05 NOTE — PROGRESS NOTES
[facility] HOSP - [facility]    Progress Note    Yissel Mercado Patient Status:  Inpatient    1970 MRN [id number]   Location [address] [facility] Attending Stacy Payton MD   Hosp Day # 4 PCP Yamila Ortiz MD        Subjective:   Ginger Henao 10/05/2020    HGB 9.5 10/05/2020    HCT 29.0 10/05/2020    .0 10/05/2020    CREATSERUM 0.88 10/05/2020    BUN 16 10/05/2020     10/05/2020    K 3.7 10/05/2020     10/05/2020    CO2 26.0 10/05/2020     (H) 10/05/2020    CA 8.3 (L)

## 2020-10-05 NOTE — PROGRESS NOTES
João [facility] [address]     Gastroenterology Progress Note    Robby Radford Patient Status:  Inpatient    1970 MRN [id number]   Location [facility] Attending Matheus Johnson MD   Hosp Day # 4 PCP Sandra Hartley MD better.       Objective:     Patient Vitals for the past 24 hrs:   BP Temp Temp src Pulse Resp SpO2   10/05/20 0900 130/75 98.2 °F (36.8 °C) Oral 81 16 96 %   10/05/20 0532 123/89 97.4 °F (36.3 °C) Oral — 16 99 %   10/05/20 0306 126/81 97.4 °F (36.3 °C) Ora Ref Rng & Units 10/5/2020 10/3/2020 10/1/2020 9/25/2020   C-REACTIVE PROTEIN      <0.30 mg/dL 0.39 (H) 1.74 (H) 4.70 (H) 2.18 (H)     Component      Latest Ref Rng & Units 9/22/2020 9/21/2020   C-REACTIVE PROTEIN      <0.30 mg/dL 1.54 (H) 3.08 (H)

## 2020-10-06 PROCEDURE — 99233 SBSQ HOSP IP/OBS HIGH 50: CPT | Performed by: PHYSICIAN ASSISTANT

## 2020-10-06 PROCEDURE — 99232 SBSQ HOSP IP/OBS MODERATE 35: CPT | Performed by: HOSPITALIST

## 2020-10-06 NOTE — PROGRESS NOTES
João [facility]     Gastroenterology Progress Note    Herminia Ruggiero Patient Status:  Inpatient    1970 MRN [id number]   Location [facility]5 [facility] Attending Maida Granda MD   Hosp Day # 5 PCP Jorge Dooley MD patient's symptoms do not continue improve, will consider infliximab level and unprepped flexible sigmoidoscopy  -pantoprazole BID with famotidine at bedtime  -will address patient's employer request for paperwork later on today or 10/7 AM  -see above    C

## 2020-10-06 NOTE — PROGRESS NOTES
[facility] HOSP - [facility]    Progress Note    Karina Lin Patient Status:  Inpatient    1970 MRN [id number]   Location [address] [facility] Attending Otf Lares MD   Hosp Day # 5 PCP Carol Love MD        Subjective:   Edwina Quezada Value Date    WBC 8.3 10/05/2020    HGB 9.5 10/05/2020    HCT 29.0 10/05/2020    .0 10/05/2020    CREATSERUM 0.88 10/05/2020    BUN 16 10/05/2020     10/05/2020    K 3.9 10/06/2020     10/05/2020    CO2 26.0 10/05/2020     (H) 10/

## 2020-10-06 NOTE — PLAN OF CARE
Problem: PAIN - ADULT  Goal: Verbalizes/displays adequate comfort level or patient's stated pain goal  Description: INTERVENTIONS:  - Encourage pt to monitor pain and request assistance  - Assess pain using appropriate pain scale  - Administer analgesics hydration with IV or PO as ordered and tolerated  - Evaluate effectiveness of GI medications  - Encourage mobilization and activity  - Obtain nutritional consult as needed  - Establish a toileting routine/schedule  - Consider collaborating with pharmacy to

## 2020-10-07 PROCEDURE — 99233 SBSQ HOSP IP/OBS HIGH 50: CPT | Performed by: PHYSICIAN ASSISTANT

## 2020-10-07 PROCEDURE — 99233 SBSQ HOSP IP/OBS HIGH 50: CPT | Performed by: HOSPITALIST

## 2020-10-07 NOTE — PROGRESS NOTES
João [facility]gado [address]     Gastroenterology Progress Note    Diann Bustillo Patient Status:  Inpatient    1970 MRN [id number]   Location [facility] Attending Dali Nevarez MD   Hosp Day # 6 PCP MD Mayi Reeves improved. #gastroesophageal reflux: She is s/p Samantha-en-Y gastric bypass. Pantoprazole was increased to twice daily with famotidine at bedtime. Her symptoms have improved.      #night sweats: occurring prior to the patient's infliximab induction; negative

## 2020-10-07 NOTE — PROGRESS NOTES
[facility]    Progress Note    Libby Noe Patient Status:  Inpatient    1970 MRN [id number]   Location [facility] Attending Jeniffer Jones MD   Hosp Day # 6 PCP Addi Whaley MD       Subjective:   Caridad Clinton influenza virus vaccine PF, ondansetron HCl, acetaminophen, Normal Saline Flush, Albuterol Sulfate HFA, HYDROcodone-acetaminophen    Results:     Recent Labs   Lab 10/03/20  0616 10/04/20  0824 10/05/20  0650   RBC 3.72 3.49 3.75   HGB 9.2 8.8 9.5   HCT

## 2020-10-07 NOTE — PAYOR COMM NOTE
[facility]  --------------  CONTINUED STAY REVIEW    Payor: ALLIED BENEFIT  Subscriber #:  [id number]  Authorization Number: [id number]    Admit date: 10/1/20  Admit time: 26    Admitting Physician: Kaylie Cain MD  Attending Physician:  Claudette Harsh, [place] morning  Had increased pain   Also episode of emesis.           Objective:   Blood pressure 112/74, pulse 80, temperature 97.6 °F (36.4 °C), temperature source Oral, resp. rate 16, weight 156 lb (70.8 kg), SpO2 99 %, not currently breastfeeding.   Objective infusion on 10/2     Still having 7-8 BM a day, more formed she says  Some ab pain and gas pains  No vomiting  Tolerating diet  No cp/sob     Objective:   Blood pressure 112/74, pulse 80, temperature 97.6 °F (36.4 °C), temperature source Oral, resp.  rate 1 steroids  -diet as tolerated - avoid lactose/dairy  -IVF maintenance   -TPMT in process   -DVT ppx, lovenox sc     Anticipate admission for another 2-3 days.      10/4 GI NOTE  Subjective:   S/p remicade infusion on 10/2     Stool more formed, no blood, st improvement. More anemic, will check iron studies. D/w patient and .  May required another 1-2 days before seeing improvement.      Recommend:  -infliximab therapy initiated 10/2  -continue IV steroids  -diet as tolerated - avoid lactose/dairy  -IVF appreciate GI roselines  - s/p scope 09/21  - will continue to monitor     Hypokalemia  - improved,   - continue electrolyte replacement protocol.      HTN  - continue home meds  - stable     VTE ppx: started on Lovenox.       Dispo:   - appreciate SCOTT miller  - c re: treatment and plan     Results:            Lab Results   Component Value Date     WBC 8.3 10/05/2020     HGB 9.5 10/05/2020     HCT 29.0 10/05/2020     .0 10/05/2020     CREATSERUM 0.88 10/05/2020     BUN 16 10/05/2020      10/05/2020     forming. No blood. Able to pass \"dry gas\" off the toilet. Main complaint is that of morning reflux with burning in the throat, nausea and back discomfort. The symptoms juan later in the morning.   Eating better.     10/6 GI NOTE  Hosp Day # 5 PCP Dasha Silva IV  -continue to monitor  -if patient's symptoms do not continue improve, will consider infliximab level and unprepped flexible sigmoidoscopy  -pantoprazole BID with famotidine at bedtime  -will address patient's employer request for paperwork later on tod home meds  - stable     VTE ppx: started on Lovenox.       Dispo:   - appreciate GI recs  - continue current management.   - replete chloé  - KATHLEEN 10/08-10/09        Greater than 35 min spent with >50% time spent counseling patient re: treatment and plan

## 2020-10-07 NOTE — DIETARY NOTE
ADULT NUTRITION REASSESSMENT    Pt is at high nutrition risk. Pt meets severe malnutrition criteria.       CRITERIA FOR MALNUTRITION DIAGNOSIS:  Criteria for severe malnutrition diagnosis: acute illness/injury related to wt loss greater than 5% in 1 month nutritional supplements)  - Meals and snacks: Encouraged adequate PO intake  - Medical Food Supplements-RD will continue  Ensure Clear daily and regular Milk daily. +PB with crackers daily. Rational/use of oral supplements discussed.   - Vitamin and mineral diff    FOOD/NUTRITION RELATED HISTORY:  Appetite: Poor  Intake: 80% x1 meal - pt usually eats 5-6 small meals/day  Intake Meeting Needs: No, but supplements to maximize  Percent Meals Eaten (last 3 days)     Date/Time Percent Meals Eaten (%)    10/04/20 0 and Nutrient Intake:      Monitor: adequacy of PO intake, tolerance of PO intake, adequacy of supplement intake and tolerance of supplement intake.   - Anthropometric Measurement:     Monitor weight  - Nutrition Goals:      maintain wt within 5%, PO and sup

## 2020-10-08 ENCOUNTER — TELEPHONE (OUTPATIENT)
Dept: GASTROENTEROLOGY | Facility: CLINIC | Age: 50
End: 2020-10-08

## 2020-10-08 VITALS
BODY MASS INDEX: 29 KG/M2 | HEART RATE: 129 BPM | WEIGHT: 156 LBS | OXYGEN SATURATION: 98 % | DIASTOLIC BLOOD PRESSURE: 69 MMHG | SYSTOLIC BLOOD PRESSURE: 138 MMHG | TEMPERATURE: 98 F | RESPIRATION RATE: 18 BRPM

## 2020-10-08 DIAGNOSIS — K51.919 ULCERATIVE COLITIS WITH COMPLICATION, UNSPECIFIED LOCATION (HCC): Primary | ICD-10-CM

## 2020-10-08 PROCEDURE — 99239 HOSP IP/OBS DSCHRG MGMT >30: CPT | Performed by: HOSPITALIST

## 2020-10-08 PROCEDURE — 99233 SBSQ HOSP IP/OBS HIGH 50: CPT | Performed by: PHYSICIAN ASSISTANT

## 2020-10-08 RX ORDER — PANTOPRAZOLE SODIUM 40 MG/1
40 TABLET, DELAYED RELEASE ORAL
Qty: 60 TABLET | Refills: 0 | Status: SHIPPED | OUTPATIENT
Start: 2020-10-08 | End: 2020-10-22

## 2020-10-08 RX ORDER — PREDNISONE 20 MG/1
40 TABLET ORAL DAILY
Qty: 16 TABLET | Refills: 0 | Status: SHIPPED | OUTPATIENT
Start: 2020-10-08 | End: 2020-10-16

## 2020-10-08 NOTE — PROGRESS NOTES
Patient dc home. IV removed. Understands to follow up with PCP in 2 week per AVS and follow up with GI MD on Thursday. Patient understands to  prescriptions (Protonix and Prednisone) which were electronically sent to pharmacy.  No physical scripts gi

## 2020-10-08 NOTE — TELEPHONE ENCOUNTER
GI RNs: The patient received her first dose of infliximab induction therapy at 5 mg/kg in the hospital.  She needs approval for her second dose on Friday or Saturday of next week, a third dose in 5 weeks and maintenance every 8 weeks.

## 2020-10-08 NOTE — TELEPHONE ENCOUNTER
Noted, will await auth from [employer] dept. I also spoke to [facility] in [facility] infusion center and she will save a spot for patient 10/15. I will notify her and fax remicadre order (on my desk) as soon as authorization obtained.

## 2020-10-08 NOTE — DISCHARGE SUMMARY
[facility] Hospitalist Discharge Summary   Patient ID:  Monet Sickle  [id number]  48year old  8/18/1970    Admit date: 10/1/2020  Discharge date: 10/8/2020  Primary Care Physician: Laurita Garcia MD   Attending Physician: Wilfrid Freitas MD   Consult Cont PPI BID with famotidine.   - Gi following.      HTN   - cont home meds.      Hypokalemia  - replaced.    - rpt labs in AM.     EXAM:   GENERAL: no apparent distress, comfortable  NEURO: A/A Ox3, no focal deficits  RESP: non labored, CTAB/L  CARDIO: Reg · Pantoprazole Sodium 40 MG Tbec  · predniSONE 20 MG Tabs         Activity: activity as tolerated  Diet: regular diet  Wound Care: NA  Code Status: Full Code        Discharge Instructions       FU with Dr. Shara Martinez in 1 week next Thursday   FU with FLORA

## 2020-10-08 NOTE — TELEPHONE ENCOUNTER
Dr Paola Guzman, Remicade form placed on your desk. Please send to RNs when complete. I will enter referral in epic so [employer] can start working on it immediately. Once authorized, I will notify [facility] and fax Remicade order sheet to them. Thanks.

## 2020-10-08 NOTE — TELEPHONE ENCOUNTER
Dr Yobani Carter, please also see other encounter from today re Remicade infusions we are getting authorized. Please advise from Priscilla CRENSHAW message below when you could see patient? Thanks.

## 2020-10-08 NOTE — TELEPHONE ENCOUNTER
PHONE ROOM. Patient has been discharged from [facility]. I left complete message on her voicemail that I added her for an appointment with Dr Narda Lutz next Thurs, Oct 15, instructed to check in by 8:45am and gave detailed directions to [facility] MELONY.  Requested that sh

## 2020-10-08 NOTE — PLAN OF CARE
No complaints of pain or discomfort throughout the night, tolerating diet, vital signs stable. Will continue to monitor.   Problem: Patient/Family Goals  Goal: Patient/Family Long Term Goal  Description: Patient's Long Term Goal: prevent recurrence    Inte on patient safety including physical limitations  - Instruct pt to call for assistance with activity based on assessment  - Modify environment to reduce risk of injury  - Provide assistive devices as appropriate  - Consider OT/PT consult to assist with str Encourage mobilization and activity  - Obtain nutritional consult as needed  - Establish a toileting routine/schedule  - Consider collaborating with pharmacy to review patient's medication profile  Outcome: Progressing  Goal: Maintains adequate nutritional

## 2020-10-08 NOTE — PROGRESS NOTES
João Delgado [facility]     Gastroenterology Progress Note    Brandanalex Harriser Patient Status:  Inpatient    1970 MRN [id number]   Location [facility] Attending Elisha Ortega MD   Hosp Day # 7 PCP MD Yassine Bajwa on discharge - patient has tablets at home already and knows to call the office if additional scripts are needed   -pantoprazole BID with famotidine at bedtime  -outpatient follow up in 1 week to be coordinated with the patient's primary GI outpatient  -se

## 2020-10-08 NOTE — TELEPHONE ENCOUNTER
Dr Washington Host, please sign off pended referral with Remicade order being sent to [employer] Auth Dept. Once auth obtained I will fax the order sheet you gave me to infusion center. Thanks.

## 2020-10-08 NOTE — TELEPHONE ENCOUNTER
INPATIENT ORDERS:  - patient will require an outpatient f/u with Dr. Mariana Deng in 1 week  - may need to communicate with Dr. Juan R Radford re: overbook if necessary  - patient to be d/c from hospital today (dx: IBD)    [facility]  Jamarcus Khan RN is currently [employer]

## 2020-10-09 ENCOUNTER — PATIENT OUTREACH (OUTPATIENT)
Dept: CASE MANAGEMENT | Age: 50
End: 2020-10-09

## 2020-10-09 DIAGNOSIS — Z02.9 ENCOUNTERS FOR ADMINISTRATIVE PURPOSE: ICD-10-CM

## 2020-10-09 DIAGNOSIS — K51.919 EXACERBATION OF ULCERATIVE COLITIS WITH COMPLICATION (HCC): ICD-10-CM

## 2020-10-09 PROCEDURE — 1111F DSCHRG MED/CURRENT MED MERGE: CPT

## 2020-10-09 NOTE — PAYOR COMM NOTE
--------------  DISCHARGE REVIEW    Payor: ALLIED BENEFIT  Subscriber #:  [id number]  Authorization Number: [id number]    Admit date: 10/1/20  Admit time:  9783  Discharge Date: 10/8/2020  2:45 PM     Admitting Physician: Keisha Aggarwal MD  Attending Ph Tylenol for pain and tries to avoid the Norco.  Currently, denies any fevers, chills, headaches, blurred vision, or palpitations. She is endorsing nausea. She states that the rectal bleeding is also improved.   Patient did undergo an unprepped flexible si times daily. hydrochlorothiazide 25 MG Tabs  Commonly known as: HYDRODIURIL  Take 1 tablet (25 mg total) by mouth daily.      HYDROcodone-acetaminophen 5-325 MG Tabs  Commonly known as: NORCO  Take 1 tablet by mouth every 6 (six) hours as needed for Evgeny Schneider minutes    Patient had opportunity to ask questions, state understanding, and agree with therapeutic plan as outlined    Kenyetta Hollis MD  Hospitalist  10/8/2020      Electronically signed by Durga Vogt MD on 10/8/2020  1:42 PM         REVIEWER COM

## 2020-10-09 NOTE — PROGRESS NOTES
Initial Post Discharge Follow Up   Discharge Date: 10/8/20  Contact Date: 10/9/2020    Consent Verification:  Assessment Completed With: Patient  HIPAA Verified?   Yes    Discharge Dx:   ulcerative colitis flare      General:   • How have you been since Sulfate (FEOSOL) 325 (65 Fe) MG Oral Tab Take 1 tablet (325 mg total) by mouth 2 (two) times daily.  60 tablet 2   • Albuterol Sulfate HFA (PROVENTIL HFA) 108 (90 Base) MCG/ACT Inhalation Aero Soln Inhale 2 puffs into the lungs every 6 (six) hours as needed referral.          [facility], [facility], [address]  [facility]  [phone] [address], [address], [facility]  [address]  [address]  [place]

## 2020-10-14 NOTE — TELEPHONE ENCOUNTER
Cecilia Ferron called me re below. Apparently opened by managed care, so dropped out of PPD Auth team box. DR Abiola Narayanan, per Cecilia Pierson, Please generate a detailed medical necessity letter, including patient Plan # [id number], group #[id number].  Cecilia Pierson states 3-4 business d

## 2020-10-14 NOTE — TELEPHONE ENCOUNTER
Completed prior authorization form, supporting clinicals and letter of medical necessity have been faxed to [employer] department marked urgent rush PA at [phone]. Phone number: [phone]. Will continue to check status.  Per plan, urgent requests can

## 2020-10-14 NOTE — TELEPHONE ENCOUNTER
Reviewed Referral # [id number] which was marked authorized by [facility] 10/9/20 but does not appear authorization has been obtained. Contacted plan at [phone] to request urgent PA for CPT  (call reference #: [id number].   Unable to submit PA

## 2020-10-15 ENCOUNTER — OFFICE VISIT (OUTPATIENT)
Dept: GASTROENTEROLOGY | Facility: CLINIC | Age: 50
End: 2020-10-15
Payer: COMMERCIAL

## 2020-10-15 VITALS
TEMPERATURE: 98 F | DIASTOLIC BLOOD PRESSURE: 65 MMHG | HEIGHT: 61.5 IN | SYSTOLIC BLOOD PRESSURE: 95 MMHG | WEIGHT: 156.38 LBS | BODY MASS INDEX: 29.15 KG/M2 | HEART RATE: 89 BPM

## 2020-10-15 DIAGNOSIS — K51.00 ULCERATIVE PANCOLITIS WITHOUT COMPLICATION (HCC): Primary | ICD-10-CM

## 2020-10-15 PROBLEM — K51.919 CHRONIC ULCERATIVE COLITIS, UNSPECIFIED COMPLICATION (HCC): Status: ACTIVE | Noted: 2020-10-15

## 2020-10-15 PROCEDURE — 99214 OFFICE O/P EST MOD 30 MIN: CPT | Performed by: INTERNAL MEDICINE

## 2020-10-15 PROCEDURE — 3074F SYST BP LT 130 MM HG: CPT | Performed by: INTERNAL MEDICINE

## 2020-10-15 PROCEDURE — 3008F BODY MASS INDEX DOCD: CPT | Performed by: INTERNAL MEDICINE

## 2020-10-15 PROCEDURE — 3078F DIAST BP <80 MM HG: CPT | Performed by: INTERNAL MEDICINE

## 2020-10-15 RX ORDER — DIPHENHYDRAMINE HCL 25 MG
25 CAPSULE ORAL ONCE
Status: CANCELLED | OUTPATIENT
Start: 2020-10-15

## 2020-10-15 RX ORDER — ACETAMINOPHEN 325 MG/1
650 TABLET ORAL ONCE
Status: CANCELLED | OUTPATIENT
Start: 2020-10-15

## 2020-10-15 NOTE — TELEPHONE ENCOUNTER
After 42 minutes on hold, reached [place] with plan and was advised that PA fax has been received and decision should be made by end of day tomorrow.  I requested she further expedite this due to concerns patient may need to be readmitted and she was able to

## 2020-10-15 NOTE — TELEPHONE ENCOUNTER
Dr Javi Guzman saw patient today and spoke to me. Would like infusion tomorrow if at all possible.  I spoke to Stephen Jolly in Infusion center and asked her to put patient back on hold for tomorrow, also to hold Tuesday as planned and let us know if any new sp

## 2020-10-15 NOTE — TELEPHONE ENCOUNTER
Contacted plan at [phone] to check status of reference #: [id number] and spoke with [place]. PA for Remicade has been approved as it meets medical guidelines.  PA approval valid 10/15/20-10/14/21 for Remicade 5MG/KG IV infusion at weeks 0,2,6, and every 8

## 2020-10-15 NOTE — TELEPHONE ENCOUNTER
Patient notified, and to keep phone with her, as Infusion center will bill calling about appt tomorrow. I spoke to [facility] in infusion center , informed of below authorization, and faxed Remicade order signed by Dr Anamaria Diego.  Gave my direct line if furth

## 2020-10-16 ENCOUNTER — OFFICE VISIT (OUTPATIENT)
Dept: HEMATOLOGY/ONCOLOGY | Facility: HOSPITAL | Age: 50
End: 2020-10-16
Attending: INTERNAL MEDICINE
Payer: COMMERCIAL

## 2020-10-16 VITALS
WEIGHT: 155.81 LBS | DIASTOLIC BLOOD PRESSURE: 64 MMHG | TEMPERATURE: 99 F | HEART RATE: 77 BPM | RESPIRATION RATE: 16 BRPM | SYSTOLIC BLOOD PRESSURE: 105 MMHG | OXYGEN SATURATION: 100 % | BODY MASS INDEX: 29 KG/M2

## 2020-10-16 DIAGNOSIS — K51.919 CHRONIC ULCERATIVE COLITIS, UNSPECIFIED COMPLICATION (HCC): Primary | ICD-10-CM

## 2020-10-16 PROCEDURE — 96415 CHEMO IV INFUSION ADDL HR: CPT

## 2020-10-16 PROCEDURE — 96413 CHEMO IV INFUSION 1 HR: CPT

## 2020-10-16 RX ORDER — ACETAMINOPHEN 325 MG/1
TABLET ORAL
Status: COMPLETED
Start: 2020-10-16 | End: 2020-10-16

## 2020-10-16 RX ORDER — DIPHENHYDRAMINE HCL 25 MG
25 CAPSULE ORAL ONCE
Status: COMPLETED | OUTPATIENT
Start: 2020-10-16 | End: 2020-10-16

## 2020-10-16 RX ORDER — DIPHENHYDRAMINE HCL 25 MG
25 CAPSULE ORAL ONCE
Status: CANCELLED | OUTPATIENT
Start: 2020-11-13

## 2020-10-16 RX ORDER — DIPHENHYDRAMINE HCL 25 MG
CAPSULE ORAL
Status: COMPLETED
Start: 2020-10-16 | End: 2020-10-16

## 2020-10-16 RX ORDER — ACETAMINOPHEN 325 MG/1
650 TABLET ORAL ONCE
Status: CANCELLED | OUTPATIENT
Start: 2020-11-13

## 2020-10-16 RX ORDER — ACETAMINOPHEN 325 MG/1
650 TABLET ORAL ONCE
Status: COMPLETED | OUTPATIENT
Start: 2020-10-16 | End: 2020-10-16

## 2020-10-16 RX ADMIN — DIPHENHYDRAMINE HCL 25 MG: 25 MG CAPSULE ORAL at 11:10:00

## 2020-10-16 RX ADMIN — ACETAMINOPHEN 650 MG: 325 TABLET ORAL at 11:10:00

## 2020-10-16 NOTE — PROGRESS NOTES
Pt to infusion for week 2 Remicade for ulcerative colitis. Tolerated first dose well in hospital. Denies any infections, fevers. States her symptoms are \"getting better every day. \" No more blood in stool, still having about 5 BM/day.    Pre-medicated as o

## 2020-10-17 ENCOUNTER — TELEPHONE (OUTPATIENT)
Dept: GASTROENTEROLOGY | Facility: CLINIC | Age: 50
End: 2020-10-17

## 2020-10-17 NOTE — PROGRESS NOTES
HPI:    Patient ID: Princess Murphy is a 48year old female. HPI  Rosetta Arreola returns in hospital follow-up today along with her  Sarina Becker. She was discharged from the hospital (second admission) on October 8th (7 days prior).   She received an ind discharge. The abdominal pain has improved \"only 1 time\" and is helped by ambulating and passing flatus. Eating smaller amounts has also helped. Stools vary from formed \"sausage links\" to \"goose poop\" to \"shasta\" and #5 daily.   She is able to p Runny nose   PHYSICAL EXAM:   Physical Exam   Constitutional: She is oriented to person, place, and time. She appears well-developed and well-nourished. No distress. Looks much better.   Not ill and in no acute distress   HENT:   Head: Normocephalic and a until the patient's steroid dose has decreased. We are attempting to obtain insurance approval for the patient's second induction dose of infliximab tomorrow. This is paramount. I have asked the patient to contact me next week with a symptom update.   If

## 2020-10-17 NOTE — PATIENT INSTRUCTIONS
1.  Complete second dose of infliximab (Remicade). 2.  Continue current dose of prednisone. 3.  Please call me with an update next week or sooner if there are any changes. 4.  Follow-up office visit in 2 weeks.

## 2020-10-19 NOTE — TELEPHONE ENCOUNTER
Dr. Washington Host    Patient does not have an appointment to follow up on 10/29/2020. Would you like us to open a spot on your schedule for her on that day? You are already 100% booked that day.     Thank you    Future Appointments   Date Time Provider Dep

## 2020-10-19 NOTE — TELEPHONE ENCOUNTER
Dr. Anamaria Diego    Would you prefer to add before your day at 8:15am or after your procedures at 4:30pm (procedures are scheduled to follow right after your last office visit at 2pm)?     Thank you

## 2020-10-20 NOTE — TELEPHONE ENCOUNTER
I spoke with the patient and she accepted appointment with Dr. Claudell Public on 10/29 per below. Given directions to office and aware to arrive 15 minutes early. She told me that she is feeling a little better each day.   She eats 5 small meals a day and

## 2020-10-22 ENCOUNTER — TELEPHONE (OUTPATIENT)
Dept: FAMILY MEDICINE CLINIC | Facility: CLINIC | Age: 50
End: 2020-10-22

## 2020-10-22 ENCOUNTER — OFFICE VISIT (OUTPATIENT)
Dept: FAMILY MEDICINE CLINIC | Facility: CLINIC | Age: 50
End: 2020-10-22
Payer: COMMERCIAL

## 2020-10-22 VITALS
HEART RATE: 89 BPM | WEIGHT: 162.19 LBS | BODY MASS INDEX: 30.23 KG/M2 | DIASTOLIC BLOOD PRESSURE: 70 MMHG | HEIGHT: 61.5 IN | SYSTOLIC BLOOD PRESSURE: 101 MMHG

## 2020-10-22 DIAGNOSIS — K51.919 ULCERATIVE COLITIS WITH COMPLICATION, UNSPECIFIED LOCATION (HCC): Primary | ICD-10-CM

## 2020-10-22 PROCEDURE — 3074F SYST BP LT 130 MM HG: CPT | Performed by: FAMILY MEDICINE

## 2020-10-22 PROCEDURE — 1111F DSCHRG MED/CURRENT MED MERGE: CPT | Performed by: FAMILY MEDICINE

## 2020-10-22 PROCEDURE — 3078F DIAST BP <80 MM HG: CPT | Performed by: FAMILY MEDICINE

## 2020-10-22 PROCEDURE — 99495 TRANSJ CARE MGMT MOD F2F 14D: CPT | Performed by: FAMILY MEDICINE

## 2020-10-22 PROCEDURE — 3008F BODY MASS INDEX DOCD: CPT | Performed by: FAMILY MEDICINE

## 2020-10-22 RX ORDER — TRAMADOL HYDROCHLORIDE 50 MG/1
50 TABLET ORAL EVERY 6 HOURS PRN
Qty: 30 TABLET | Refills: 0 | Status: SHIPPED | OUTPATIENT
Start: 2020-10-22 | End: 2021-11-18

## 2020-10-22 RX ORDER — PANTOPRAZOLE SODIUM 40 MG/1
40 TABLET, DELAYED RELEASE ORAL
Qty: 60 TABLET | Refills: 0 | Status: SHIPPED | OUTPATIENT
Start: 2020-10-22 | End: 2020-11-14

## 2020-10-22 RX ORDER — ALBUTEROL SULFATE 90 UG/1
2 AEROSOL, METERED RESPIRATORY (INHALATION) EVERY 6 HOURS PRN
Qty: 1 INHALER | Refills: 2 | Status: SHIPPED | OUTPATIENT
Start: 2020-10-22

## 2020-10-22 NOTE — PROGRESS NOTES
HPI:    Gato Ayon is a 48year old female here today for hospital follow up.    She was discharged from Inpatient hospital, [facility]  to Home   Admission Date: 10/1/20   Discharge Date: 10/8/20  Hospital Discharge Diagnoses (since 9/22/20 Asthma, Asthma, mild intermittent, Fibroids, H/O mumps, H/O pregnancy (1990, 1993, ?, ?), Hemorrhoids, History of chicken pox, Lipid screening (9/24/2011), Lung tumor (benign) (2000), Morbid obesity with BMI of 45.0-49.9, [facility] Obesity, unspecified, following are true:  Communication with the patient was made within 2 business days of discharge on date above   Medical Decision Making- Based on service period of discharge to 30 days:   · Number of Possible Diagnoses and/or Management Options: moderate

## 2020-10-29 ENCOUNTER — OFFICE VISIT (OUTPATIENT)
Dept: GASTROENTEROLOGY | Facility: CLINIC | Age: 50
End: 2020-10-29
Payer: COMMERCIAL

## 2020-10-29 ENCOUNTER — TELEPHONE (OUTPATIENT)
Dept: GASTROENTEROLOGY | Facility: CLINIC | Age: 50
End: 2020-10-29

## 2020-10-29 VITALS
HEIGHT: 61.5 IN | SYSTOLIC BLOOD PRESSURE: 121 MMHG | HEART RATE: 75 BPM | DIASTOLIC BLOOD PRESSURE: 76 MMHG | WEIGHT: 161 LBS | BODY MASS INDEX: 30.01 KG/M2

## 2020-10-29 DIAGNOSIS — K51.919 ULCERATIVE COLITIS WITH COMPLICATION, UNSPECIFIED LOCATION (HCC): Primary | ICD-10-CM

## 2020-10-29 DIAGNOSIS — K51.00 ULCERATIVE PANCOLITIS WITHOUT COMPLICATION (HCC): Primary | ICD-10-CM

## 2020-10-29 PROCEDURE — 99213 OFFICE O/P EST LOW 20 MIN: CPT | Performed by: INTERNAL MEDICINE

## 2020-10-29 PROCEDURE — 3074F SYST BP LT 130 MM HG: CPT | Performed by: INTERNAL MEDICINE

## 2020-10-29 PROCEDURE — 3078F DIAST BP <80 MM HG: CPT | Performed by: INTERNAL MEDICINE

## 2020-10-29 PROCEDURE — 3008F BODY MASS INDEX DOCD: CPT | Performed by: INTERNAL MEDICINE

## 2020-10-29 NOTE — PROGRESS NOTES
HPI:    Patient ID: Akbar Nixon is a 48year old female. HPI  Oren Nuñez returns in follow-up today. She was last seen on October 15. She was discharged from the hospital (second admission) on October 8th (21 days prior).   She received an induction with contemplation of decreasing the dose to 30 mg daily the following week. She saw Dr. Kiatlin Cunningham last week. We discussed the patient's case regarding the addition of dicyclomine and elected to avoid this medication in light of the active IBD.   Prednisone • carvedilol 6.25 MG Oral Tab TAKE 1 TABLET BY MOUTH TWICE DAILY WITH FOOD 180 tablet 0   • Ferrous Sulfate (FEOSOL) 325 (65 Fe) MG Oral Tab Take 1 tablet (325 mg total) by mouth 2 (two) times daily.  60 tablet 2     Allergies:  Seasonal                Ru the patient continue 40 mg daily of prednisone for now. I am reprimanding an unprepped sedated flexible sigmoidoscopy in the next several days to evaluate disease extent and activity. A fecal calprotectin will be obtained at the time of the procedure.   I

## 2020-10-29 NOTE — PATIENT INSTRUCTIONS
1.  Schedule unprepped flexible sigmoidoscopy with IV sedation on 11/3/2020 at 11:45 AM for ulcerative colitis. 2.  Continue current dose of prednisone for now.

## 2020-10-29 NOTE — TELEPHONE ENCOUNTER
Scheduled for:  Flex Sig 21234  Provider Name:    Date:  11/3/20  Location:  [facility]  Sedation:  MAC  Time:  1145am arrive 1045am  Prep:  none  Meds/Allergies Reconciled?:  Physician reviewed    Diagnosis with codes:  Ulcerative colitis K51.90

## 2020-10-31 ENCOUNTER — APPOINTMENT (OUTPATIENT)
Dept: LAB | Facility: HOSPITAL | Age: 50
End: 2020-10-31
Attending: INTERNAL MEDICINE
Payer: COMMERCIAL

## 2020-10-31 DIAGNOSIS — Z01.818 PRE-OP TESTING: ICD-10-CM

## 2020-11-03 ENCOUNTER — ANESTHESIA (OUTPATIENT)
Dept: ENDOSCOPY | Facility: HOSPITAL | Age: 50
End: 2020-11-03
Payer: COMMERCIAL

## 2020-11-03 ENCOUNTER — ANESTHESIA EVENT (OUTPATIENT)
Dept: ENDOSCOPY | Facility: HOSPITAL | Age: 50
End: 2020-11-03
Payer: COMMERCIAL

## 2020-11-03 ENCOUNTER — HOSPITAL ENCOUNTER (OUTPATIENT)
Facility: HOSPITAL | Age: 50
Setting detail: HOSPITAL OUTPATIENT SURGERY
Discharge: HOME | End: 2020-11-03
Attending: INTERNAL MEDICINE | Admitting: INTERNAL MEDICINE
Payer: COMMERCIAL

## 2020-11-03 VITALS
HEART RATE: 75 BPM | WEIGHT: 161 LBS | SYSTOLIC BLOOD PRESSURE: 112 MMHG | RESPIRATION RATE: 14 BRPM | HEIGHT: 61.5 IN | TEMPERATURE: 97 F | DIASTOLIC BLOOD PRESSURE: 80 MMHG | OXYGEN SATURATION: 99 % | BODY MASS INDEX: 30.01 KG/M2

## 2020-11-03 DIAGNOSIS — K51.919 ULCERATIVE COLITIS WITH COMPLICATION, UNSPECIFIED LOCATION (HCC): ICD-10-CM

## 2020-11-03 DIAGNOSIS — Z01.818 PRE-OP TESTING: Primary | ICD-10-CM

## 2020-11-03 PROCEDURE — 45331 SIGMOIDOSCOPY AND BIOPSY: CPT | Performed by: INTERNAL MEDICINE

## 2020-11-03 PROCEDURE — 0DBN8ZX EXCISION OF SIGMOID COLON, VIA NATURAL OR ARTIFICIAL OPENING ENDOSCOPIC, DIAGNOSTIC: ICD-10-PCS | Performed by: INTERNAL MEDICINE

## 2020-11-03 RX ORDER — SODIUM CHLORIDE, SODIUM LACTATE, POTASSIUM CHLORIDE, CALCIUM CHLORIDE 600; 310; 30; 20 MG/100ML; MG/100ML; MG/100ML; MG/100ML
INJECTION, SOLUTION INTRAVENOUS CONTINUOUS
Status: DISCONTINUED | OUTPATIENT
Start: 2020-11-03 | End: 2020-11-03

## 2020-11-03 RX ORDER — LIDOCAINE HYDROCHLORIDE 10 MG/ML
INJECTION, SOLUTION EPIDURAL; INFILTRATION; INTRACAUDAL; PERINEURAL AS NEEDED
Status: DISCONTINUED | OUTPATIENT
Start: 2020-11-03 | End: 2020-11-03 | Stop reason: SURG

## 2020-11-03 RX ORDER — NALOXONE HYDROCHLORIDE 0.4 MG/ML
80 INJECTION, SOLUTION INTRAMUSCULAR; INTRAVENOUS; SUBCUTANEOUS AS NEEDED
Status: DISCONTINUED | OUTPATIENT
Start: 2020-11-03 | End: 2020-11-03

## 2020-11-03 RX ADMIN — SODIUM CHLORIDE, SODIUM LACTATE, POTASSIUM CHLORIDE, CALCIUM CHLORIDE: 600; 310; 30; 20 INJECTION, SOLUTION INTRAVENOUS at 12:52:00

## 2020-11-03 RX ADMIN — LIDOCAINE HYDROCHLORIDE 50 MG: 10 INJECTION, SOLUTION EPIDURAL; INFILTRATION; INTRACAUDAL; PERINEURAL at 12:56:00

## 2020-11-03 RX ADMIN — SODIUM CHLORIDE, SODIUM LACTATE, POTASSIUM CHLORIDE, CALCIUM CHLORIDE: 600; 310; 30; 20 INJECTION, SOLUTION INTRAVENOUS at 13:07:00

## 2020-11-03 NOTE — OPERATIVE REPORT
[facility] Endoscopy Report      Date of Procedure:  11/03/20      Preoperative Diagnosis:  Ulcerative colitis evaluate disease extent and activity      Postoperative Diagnosis:  1. Persistent sigmoid colon ulcerations  2.   Improvement in no colonic polyps or mass lesions seen. Retroflexion was not performed. Impression:  1. Persistent sigmoid ulcerations, improved in appearance  2. Generalized improvement in the appearance of the rectosigmoid as compared to 6 weeks prior  3.   Uncomp

## 2020-11-03 NOTE — ANESTHESIA PREPROCEDURE EVALUATION
Anesthesia PreOp Note    HPI:     Gaurang Martines is a 48year old female who presents for preoperative consultation requested by: Sandi Morris MD    Date of Surgery: 11/3/2020    Procedure(s):   FLEXIBLE SIGMOIDOSCOPY  Indication: Ulcerative • UC (ulcerative colitis) ([facility]    • Unspecified essential hypertension        Past Surgical History:   Procedure Laterality Date   • COLONOSCOPY  3/26/2013    per NG   • COLONOSCOPY  2007    Colonoscopy with biopsy   • COLONOSCOPY N/A 9/21/2020    Perform Spouse name: Not on file      Number of children: Not on file      Years of education: Not on file      Highest education level: Not on file    Occupational History      Not on file    Social Needs      Financial resource strain: Not on file      Food height is 1.562 m (5' 1.5\") and weight is 73 kg (161 lb). Her temperature is 97.2 °F (36.2 °C). Her blood pressure is 120/85 and her pulse is 63. Her respiration is 14 and oxygen saturation is 100%.     11/03/20  1113   BP: 120/85   Pulse: 63   Resp: 14

## 2020-11-03 NOTE — H&P
History & Physical Examination    Patient Name: Gaurang Martines  MRN: [id number]  [facility]: [id number]  YOB: 1970    Diagnosis: Ulcerative colitis evaluate disease extent and activity        •  VITAMIN D, CHOLECALCIFEROL, OR, Take by mouth., 3/26/2013    per NG   • COLONOSCOPY  2007    Colonoscopy with biopsy   • COLONOSCOPY N/A 9/21/2020    Performed by Wilbur Goldberg, MD at [address] hysterectomy   • LAP GASTRIC BYPASS/LEEANNA-EN-Y  04/11/2016

## 2020-11-03 NOTE — ANESTHESIA POSTPROCEDURE EVALUATION
Patient: Miquel Cleaning    Procedure Summary     Date: 11/03/20 Room / Location: [address] [place] [address] ENDOSCOPY 04 / [address] [place] [address] ENDOSCOPY    Anesthesia Start: 7277 Anesthesia Stop: 1310    Procedure: FLEXIBLE SIGMOIDOSCOPY (N/A ) Diagnosis:       Ulcerative colitis with com

## 2020-11-05 NOTE — TELEPHONE ENCOUNTER
Letter received reflecting below information in regards to prior authorization of Remicade and sent to be scanned into epic.

## 2020-11-07 ENCOUNTER — TELEPHONE (OUTPATIENT)
Dept: GASTROENTEROLOGY | Facility: CLINIC | Age: 50
End: 2020-11-07

## 2020-11-07 DIAGNOSIS — K51.919 ULCERATIVE COLITIS WITH COMPLICATION, UNSPECIFIED LOCATION (HCC): Primary | ICD-10-CM

## 2020-11-07 NOTE — TELEPHONE ENCOUNTER
GI RNs: I left a message on the patient's cell phone voicemail on Saturday. Her infliximab level is only 1.85 3 weeks post her second induction infusion of 5 mg/kg. Please contact the patient on Monday.   I am recommending that the patient have a repeat i

## 2020-11-09 NOTE — TELEPHONE ENCOUNTER
Called [employer] at [phone] and spent greater than 60 minutes on hold before the call disconnected. Will call back later.

## 2020-11-09 NOTE — TELEPHONE ENCOUNTER
Dr Katia Gonzalez, I spoke to Gunner in the infusion center, and will have PPD auth dept get authorization for increased dose. She is due for her week 6 infusion this Fri 11/13. Will this be a permanent increase or just one time? Thanks.

## 2020-11-09 NOTE — TELEPHONE ENCOUNTER
Noted, Dr Puja Thompson, please sign off pended order placed STAT. Once approved by [facility] auth dept patient will be notified and I will fax the order to [facility] infusion center. I spoke to patient and updated on below. Thanks.

## 2020-11-13 ENCOUNTER — OFFICE VISIT (OUTPATIENT)
Dept: HEMATOLOGY/ONCOLOGY | Facility: HOSPITAL | Age: 50
End: 2020-11-13
Attending: INTERNAL MEDICINE
Payer: COMMERCIAL

## 2020-11-13 VITALS
SYSTOLIC BLOOD PRESSURE: 118 MMHG | HEART RATE: 73 BPM | BODY MASS INDEX: 30 KG/M2 | WEIGHT: 164 LBS | OXYGEN SATURATION: 100 % | TEMPERATURE: 98 F | DIASTOLIC BLOOD PRESSURE: 77 MMHG | RESPIRATION RATE: 16 BRPM

## 2020-11-13 DIAGNOSIS — K51.919 CHRONIC ULCERATIVE COLITIS, UNSPECIFIED COMPLICATION (HCC): Primary | ICD-10-CM

## 2020-11-13 PROCEDURE — 96415 CHEMO IV INFUSION ADDL HR: CPT

## 2020-11-13 PROCEDURE — 96413 CHEMO IV INFUSION 1 HR: CPT

## 2020-11-13 RX ORDER — DIPHENHYDRAMINE HCL 25 MG
25 CAPSULE ORAL ONCE
Status: COMPLETED | OUTPATIENT
Start: 2020-11-13 | End: 2020-11-13

## 2020-11-13 RX ORDER — DIPHENHYDRAMINE HCL 25 MG
CAPSULE ORAL
Status: COMPLETED
Start: 2020-11-13 | End: 2020-11-13

## 2020-11-13 RX ORDER — ACETAMINOPHEN 325 MG/1
650 TABLET ORAL ONCE
Status: CANCELLED | OUTPATIENT
Start: 2021-01-08

## 2020-11-13 RX ORDER — ACETAMINOPHEN 325 MG/1
TABLET ORAL
Status: COMPLETED
Start: 2020-11-13 | End: 2020-11-13

## 2020-11-13 RX ORDER — DIPHENHYDRAMINE HCL 25 MG
25 CAPSULE ORAL ONCE
Status: CANCELLED | OUTPATIENT
Start: 2021-01-08

## 2020-11-13 RX ORDER — ACETAMINOPHEN 325 MG/1
650 TABLET ORAL ONCE
Status: COMPLETED | OUTPATIENT
Start: 2020-11-13 | End: 2020-11-13

## 2020-11-13 RX ADMIN — ACETAMINOPHEN 650 MG: 325 TABLET ORAL at 11:28:00

## 2020-11-13 RX ADMIN — DIPHENHYDRAMINE HCL 25 MG: 25 MG CAPSULE ORAL at 11:28:00

## 2020-11-13 NOTE — TELEPHONE ENCOUNTER
Contacted plan and received message that they are experiencing extremely long hold times. New PA form completed for dose increase ([email or web address]) and has been faxed with supporting clinicals to [facility]

## 2020-11-13 NOTE — PROGRESS NOTES
Pt to infusion for week 6 Remicade for ulcerative colitis. Tolerated first dose well in hospital. Denies any infections, fevers,pain  . Pre-medicated as ordered. PIV started in [facility] x1 attempt; line flushing well with + blood return noted.    Infusion giv

## 2020-11-14 RX ORDER — FERROUS SULFATE 325(65) MG
325 TABLET ORAL 2 TIMES DAILY
Qty: 60 TABLET | Refills: 2 | Status: SHIPPED | OUTPATIENT
Start: 2020-11-14

## 2020-11-14 RX ORDER — PANTOPRAZOLE SODIUM 40 MG/1
40 TABLET, DELAYED RELEASE ORAL
Qty: 60 TABLET | Refills: 0 | Status: SHIPPED | OUTPATIENT
Start: 2020-11-14 | End: 2020-12-14

## 2020-11-14 RX ORDER — CARVEDILOL 6.25 MG/1
TABLET ORAL
Qty: 180 TABLET | Refills: 0 | Status: SHIPPED | OUTPATIENT
Start: 2020-11-14

## 2020-11-14 RX ORDER — HYDROCHLOROTHIAZIDE 25 MG/1
25 TABLET ORAL DAILY
Qty: 90 TABLET | Refills: 1 | Status: SHIPPED | OUTPATIENT
Start: 2020-11-14

## 2020-11-16 ENCOUNTER — PATIENT MESSAGE (OUTPATIENT)
Dept: GASTROENTEROLOGY | Facility: CLINIC | Age: 50
End: 2020-11-16

## 2020-11-17 RX ORDER — PREDNISONE 20 MG/1
TABLET ORAL
Qty: 16 TABLET | Refills: 0 | Status: CANCELLED | OUTPATIENT
Start: 2020-11-17

## 2020-11-17 NOTE — TELEPHONE ENCOUNTER
From: Rani Hernandez  To: Tra Iqbal MD  Sent: 11/16/2020 11:25 AM CST  Subject: Prescription Question    I hope you are well. Down to my next to last dose of Prednisone at 40mg. Please advice or send refill to RX. Thank you so much.

## 2020-11-17 NOTE — TELEPHONE ENCOUNTER
Dr. Mima Samson, this refill request was forwarded to me in error. Patient has a history of ulcerative pancolitis status post flex sig in November 2020 with persistent sigmoid ulcerations on infliximab.   Your pathology report indicated the patient could

## 2020-11-18 RX ORDER — PREDNISONE 10 MG/1
TABLET ORAL
Qty: 42 TABLET | Refills: 0 | Status: SHIPPED | OUTPATIENT
Start: 2020-11-18 | End: 2020-12-08

## 2020-11-18 NOTE — TELEPHONE ENCOUNTER
I clarified prednisone taper directions with Dr. Charles Cristina and left the patient a voicemail regarding this. Patient is to taper to prednisone 30 mg x 7 days. If she is continuing to do well she will then taper to 20 mg x 7 days, then 10 mg x 7 days.

## 2020-11-18 NOTE — TELEPHONE ENCOUNTER
The patient should decrease the prednisone to 30 mg daily. She should have received 10 mg/kg of infliximab. Once the patient has taken 30 mg daily of prednisone for 1 week, I would decrease to 20 mg daily if she is otherwise well.

## 2020-11-23 NOTE — TELEPHONE ENCOUNTER
Called [employer] at [phone] to check status of PA for dose increase. Call reference #: [id number]. This PA is handled by internal pharmacy team and best direct number is [phone] option 3 for pharmacy.  [facility]

## 2020-11-23 NOTE — TELEPHONE ENCOUNTER
MANOLO to Dr Severo Juarez and DONALD Randolph staff. We have not yet received the letter. Will watch for it. Further orders?

## 2020-11-24 NOTE — TELEPHONE ENCOUNTER
Is the patient still on prednisone? What dose? We should repeat an infliximab level now in an effort to determine whether 5 mg/kg is the right dose or a higher dose is required. I have placed the orders.

## 2020-11-24 NOTE — TELEPHONE ENCOUNTER
Patient contacted. She continues to have left sided abdominal cramping that is relieved somewhat with Tylenol, but when it occurs, it is level 6 and lasts at least 10 minutes. Unrelated to meals. At times has urgency, but does not have stool.  Has about 4 f

## 2020-11-24 NOTE — TELEPHONE ENCOUNTER
Patient contacted and message from Dr. Stephens Spar given. Patient states she is currently taking Prednisone 30 mg daily this week and will decrease to 20 mg daily next week. Patient states she will get her blood work done this week.  Number for Eldridge S

## 2020-12-01 ENCOUNTER — LAB ENCOUNTER (OUTPATIENT)
Dept: LAB | Facility: HOSPITAL | Age: 50
End: 2020-12-01
Attending: INTERNAL MEDICINE
Payer: COMMERCIAL

## 2020-12-01 DIAGNOSIS — K51.919 ULCERATIVE COLITIS WITH COMPLICATION, UNSPECIFIED LOCATION (HCC): ICD-10-CM

## 2020-12-01 PROCEDURE — 85025 COMPLETE CBC W/AUTO DIFF WBC: CPT

## 2020-12-01 PROCEDURE — 36415 COLL VENOUS BLD VENIPUNCTURE: CPT

## 2020-12-01 PROCEDURE — 86140 C-REACTIVE PROTEIN: CPT

## 2020-12-01 PROCEDURE — 80053 COMPREHEN METABOLIC PANEL: CPT

## 2020-12-01 PROCEDURE — 82397 CHEMILUMINESCENT ASSAY: CPT

## 2020-12-01 PROCEDURE — 80230 DRUG ASSAY INFLIXIMAB: CPT

## 2020-12-02 NOTE — TELEPHONE ENCOUNTER
I spoke to [place]. Her blood sugar is inexplicably 52. She is not on hypoglycemic medications. Her potassium is 3.3 which is a bit better than before. Albumin has increased to 3.3. CBC has normalized.   CRP is normal.  [place]and continues to experience yi

## 2020-12-08 ENCOUNTER — TELEPHONE (OUTPATIENT)
Dept: GASTROENTEROLOGY | Facility: CLINIC | Age: 50
End: 2020-12-08

## 2020-12-08 RX ORDER — PREDNISONE 10 MG/1
20 TABLET ORAL DAILY
Qty: 60 TABLET | Refills: 0 | Status: SHIPPED | OUTPATIENT
Start: 2020-12-08

## 2020-12-08 NOTE — TELEPHONE ENCOUNTER
Patient states she tapered down on her Prednisone to 10 mg  daily but her diarrhea returned (back up to 5 episodes daily)  Patient asking to go back up to 20 mg daily. Please send to [facility] since patient is out of medication.  Thank you    Leticia latham

## 2020-12-08 NOTE — TELEPHONE ENCOUNTER
Patient called in needs to get refill she is completely out of the medication predniSONE 10 MG Oral Tab.  Please follow up

## 2020-12-08 NOTE — TELEPHONE ENCOUNTER
Patient contacted and states she will wait to make appointment till after she hears back from the doctor that is regulating her Remicade. Patient will call back. No further action needed at this time.

## 2020-12-08 NOTE — TELEPHONE ENCOUNTER
Patient stopped in office to schedule an appointment with Dr. Anamaria Diego. First available not until the end of January and patient is requesting an appointment sooner. Please follow-up with patient.

## 2020-12-11 NOTE — TELEPHONE ENCOUNTER
Please see 12/8/2020 test results below.       Imelda Ramirez MD   12/8/2020  7:41 PM      I spoke to Beverly Booth ran out of prednisone on Saturday.  She has noted increasing abdominal discomfort and today had diarrhea.  Her infliximab level is 27.1

## 2020-12-18 ENCOUNTER — TELEPHONE (OUTPATIENT)
Dept: GASTROENTEROLOGY | Facility: CLINIC | Age: 50
End: 2020-12-18

## 2020-12-18 RX ORDER — PREDNISONE 1 MG/1
TABLET ORAL
Qty: 60 TABLET | Refills: 0 | Status: SHIPPED | OUTPATIENT
Start: 2020-12-18

## 2020-12-18 NOTE — TELEPHONE ENCOUNTER
I spoke to Shukri Aly. She is feeling better on 20 mg of prednisone. Her cramping has decreased in frequency to every 5 days. Bowel movements are down to #4 daily. She has been able to exercise on the treadmill. 10 mg/kg of infliximab has been denied.   The

## 2020-12-18 NOTE — TELEPHONE ENCOUNTER
Dr. Bita Abebe received from third party reviewer Shawnee Little with [phone] (Fax# [phone])    \"In response to your request for Remicade 10mg/kd IV on week 6 and every 8 weeks for 12 months for the diagnosis of ulcerative co

## 2021-01-08 ENCOUNTER — OFFICE VISIT (OUTPATIENT)
Dept: HEMATOLOGY/ONCOLOGY | Facility: HOSPITAL | Age: 51
End: 2021-01-08
Attending: INTERNAL MEDICINE
Payer: COMMERCIAL

## 2021-01-08 VITALS
WEIGHT: 181 LBS | SYSTOLIC BLOOD PRESSURE: 134 MMHG | TEMPERATURE: 98 F | OXYGEN SATURATION: 100 % | DIASTOLIC BLOOD PRESSURE: 84 MMHG | RESPIRATION RATE: 16 BRPM | HEART RATE: 62 BPM | BODY MASS INDEX: 34 KG/M2

## 2021-01-08 DIAGNOSIS — K51.919 CHRONIC ULCERATIVE COLITIS, UNSPECIFIED COMPLICATION (HCC): Primary | ICD-10-CM

## 2021-01-08 PROCEDURE — 96415 CHEMO IV INFUSION ADDL HR: CPT

## 2021-01-08 PROCEDURE — 96413 CHEMO IV INFUSION 1 HR: CPT

## 2021-01-08 RX ORDER — ACETAMINOPHEN 325 MG/1
TABLET ORAL
Status: COMPLETED
Start: 2021-01-08 | End: 2021-01-08

## 2021-01-08 RX ORDER — DIPHENHYDRAMINE HCL 25 MG
25 CAPSULE ORAL ONCE
Status: CANCELLED | OUTPATIENT
Start: 2021-03-05

## 2021-01-08 RX ORDER — DIPHENHYDRAMINE HCL 25 MG
25 CAPSULE ORAL ONCE
Status: COMPLETED | OUTPATIENT
Start: 2021-01-08 | End: 2021-01-08

## 2021-01-08 RX ORDER — ACETAMINOPHEN 325 MG/1
650 TABLET ORAL ONCE
Status: COMPLETED | OUTPATIENT
Start: 2021-01-08 | End: 2021-01-08

## 2021-01-08 RX ORDER — ACETAMINOPHEN 325 MG/1
650 TABLET ORAL ONCE
Status: CANCELLED | OUTPATIENT
Start: 2021-03-05

## 2021-01-08 RX ORDER — DIPHENHYDRAMINE HCL 25 MG
CAPSULE ORAL
Status: COMPLETED
Start: 2021-01-08 | End: 2021-01-08

## 2021-01-08 RX ADMIN — DIPHENHYDRAMINE HCL 25 MG: 25 MG CAPSULE ORAL at 10:46:00

## 2021-01-08 RX ADMIN — ACETAMINOPHEN 650 MG: 325 TABLET ORAL at 10:46:00

## 2021-01-08 NOTE — PROGRESS NOTES
Pt to infusion for q 8 week Remicade for ulcerative colitis. Tolerated previous doses. . Denies any infections, fevers,pain. She does states she gets occasional abdominal pain when it's getting closer to the time for her infusion.    . Pre-medicated as orde

## 2021-03-05 ENCOUNTER — OFFICE VISIT (OUTPATIENT)
Dept: HEMATOLOGY/ONCOLOGY | Facility: HOSPITAL | Age: 51
End: 2021-03-05
Attending: INTERNAL MEDICINE
Payer: COMMERCIAL

## 2021-03-05 VITALS
SYSTOLIC BLOOD PRESSURE: 122 MMHG | TEMPERATURE: 99 F | BODY MASS INDEX: 36 KG/M2 | DIASTOLIC BLOOD PRESSURE: 78 MMHG | RESPIRATION RATE: 16 BRPM | OXYGEN SATURATION: 100 % | HEART RATE: 74 BPM | WEIGHT: 191.81 LBS

## 2021-03-05 DIAGNOSIS — K51.919 CHRONIC ULCERATIVE COLITIS, UNSPECIFIED COMPLICATION (HCC): Primary | ICD-10-CM

## 2021-03-05 PROCEDURE — 96415 CHEMO IV INFUSION ADDL HR: CPT

## 2021-03-05 PROCEDURE — 96413 CHEMO IV INFUSION 1 HR: CPT

## 2021-03-05 RX ORDER — ACETAMINOPHEN 325 MG/1
650 TABLET ORAL ONCE
Status: CANCELLED | OUTPATIENT
Start: 2021-04-30

## 2021-03-05 RX ORDER — DIPHENHYDRAMINE HCL 25 MG
25 CAPSULE ORAL ONCE
Status: CANCELLED | OUTPATIENT
Start: 2021-04-30

## 2021-03-05 RX ORDER — DIPHENHYDRAMINE HCL 25 MG
25 CAPSULE ORAL ONCE
Status: COMPLETED | OUTPATIENT
Start: 2021-03-05 | End: 2021-03-05

## 2021-03-05 RX ORDER — ACETAMINOPHEN 325 MG/1
650 TABLET ORAL ONCE
Status: COMPLETED | OUTPATIENT
Start: 2021-03-05 | End: 2021-03-05

## 2021-03-05 RX ORDER — ACETAMINOPHEN 325 MG/1
TABLET ORAL
Status: COMPLETED
Start: 2021-03-05 | End: 2021-03-05

## 2021-03-05 RX ORDER — DIPHENHYDRAMINE HCL 25 MG
CAPSULE ORAL
Status: COMPLETED
Start: 2021-03-05 | End: 2021-03-05

## 2021-03-05 RX ADMIN — DIPHENHYDRAMINE HCL 25 MG: 25 MG CAPSULE ORAL at 10:48:00

## 2021-03-05 RX ADMIN — ACETAMINOPHEN 650 MG: 325 TABLET ORAL at 10:48:00

## 2021-03-05 NOTE — PROGRESS NOTES
Pt to infusion for q 8 week Remicade for ulcerative colitis. Tolerated previous doses. Denies any infections, fevers,pain. She feels this medication has helped her GI symptoms with less frequency. Pre-medicated as ordered. PIV to right ac.    Infusion giv

## 2021-03-10 ENCOUNTER — TELEPHONE (OUTPATIENT)
Dept: GASTROENTEROLOGY | Facility: CLINIC | Age: 51
End: 2021-03-10

## 2021-03-11 NOTE — TELEPHONE ENCOUNTER
Dr. Renaldo Jeffries-    Please advise. I called and spoke with the patient. She states she was on an approved time off while she weaned off of Prednisone, which she states is now completed.     She reports feeling well enough to return work and is requesting a le

## 2021-03-12 NOTE — TELEPHONE ENCOUNTER
I have dictated the patient's letter but have not sent it. She may return to work. I would, however, like to see her in the office in the next few weeks at her convenience.   Please send the letter via My Chart or US mail

## 2021-03-15 NOTE — TELEPHONE ENCOUNTER
Thank you, Dr. Javi Valle. I called the patient and notified her that I will send letter via VNG at this time, as well as send a copy to her home address.      I scheduled her for a follow-up appointment to see you in the office:  Future Appointments   D

## 2021-03-17 ENCOUNTER — OFFICE VISIT (OUTPATIENT)
Dept: GASTROENTEROLOGY | Facility: CLINIC | Age: 51
End: 2021-03-17
Payer: COMMERCIAL

## 2021-03-17 ENCOUNTER — TELEPHONE (OUTPATIENT)
Dept: GASTROENTEROLOGY | Facility: CLINIC | Age: 51
End: 2021-03-17

## 2021-03-17 VITALS
WEIGHT: 194 LBS | BODY MASS INDEX: 35.7 KG/M2 | HEART RATE: 86 BPM | SYSTOLIC BLOOD PRESSURE: 119 MMHG | DIASTOLIC BLOOD PRESSURE: 80 MMHG | HEIGHT: 62 IN | TEMPERATURE: 99 F

## 2021-03-17 DIAGNOSIS — Z87.19 HX OF CROHN'S DISEASE: Primary | ICD-10-CM

## 2021-03-17 DIAGNOSIS — K51.00 ULCERATIVE PANCOLITIS WITHOUT COMPLICATION (HCC): Primary | ICD-10-CM

## 2021-03-17 PROCEDURE — 3079F DIAST BP 80-89 MM HG: CPT | Performed by: INTERNAL MEDICINE

## 2021-03-17 PROCEDURE — 99214 OFFICE O/P EST MOD 30 MIN: CPT | Performed by: INTERNAL MEDICINE

## 2021-03-17 PROCEDURE — 3008F BODY MASS INDEX DOCD: CPT | Performed by: INTERNAL MEDICINE

## 2021-03-17 PROCEDURE — 3074F SYST BP LT 130 MM HG: CPT | Performed by: INTERNAL MEDICINE

## 2021-03-17 NOTE — TELEPHONE ENCOUNTER
Scheduled for: Colonoscopy [address]   Provider Name: Dr Mario Garduno   Date:  Mon 6/28/2021   Location:[facility]   Sedation: MAC    Time: 10:00 am   Prep: split dose Miralax/gatorade   Meds/Allergies Reconciled?: NKDA   Diagnosis with codes: Hx Crohn's Z87.19   Was

## 2021-03-17 NOTE — PATIENT INSTRUCTIONS
1.  Schedule colonoscopy for a history of indeterminate (not conclusively Crohn's) colitis following a split dose MiraLAX/Gatorade preparation and monitored anesthesia care  2. Continue Remicade. 3.  Please obtain blood work tomorrow.

## 2021-03-18 ENCOUNTER — LAB ENCOUNTER (OUTPATIENT)
Dept: LAB | Facility: HOSPITAL | Age: 51
End: 2021-03-18
Attending: INTERNAL MEDICINE
Payer: COMMERCIAL

## 2021-03-18 ENCOUNTER — TELEPHONE (OUTPATIENT)
Dept: GASTROENTEROLOGY | Facility: CLINIC | Age: 51
End: 2021-03-18

## 2021-03-18 DIAGNOSIS — K51.00 ULCERATIVE PANCOLITIS WITHOUT COMPLICATION (HCC): ICD-10-CM

## 2021-03-18 LAB
ALBUMIN SERPL-MCNC: 4 G/DL (ref 3.4–5)
ALBUMIN/GLOB SERPL: 1.1 {RATIO} (ref 1–2)
ALP LIVER SERPL-CCNC: 92 U/L
ALT SERPL-CCNC: 17 U/L
ANION GAP SERPL CALC-SCNC: 8 MMOL/L (ref 0–18)
AST SERPL-CCNC: 15 U/L (ref 15–37)
BASOPHILS # BLD AUTO: 0.03 X10(3) UL (ref 0–0.2)
BASOPHILS NFR BLD AUTO: 0.5 %
BILIRUB SERPL-MCNC: 0.5 MG/DL (ref 0.1–2)
BUN BLD-MCNC: 15 MG/DL (ref 7–18)
BUN/CREAT SERPL: 15.2 (ref 10–20)
CALCIUM BLD-MCNC: 9.1 MG/DL (ref 8.5–10.1)
CHLORIDE SERPL-SCNC: 104 MMOL/L (ref 98–112)
CO2 SERPL-SCNC: 29 MMOL/L (ref 21–32)
CREAT BLD-MCNC: 0.99 MG/DL
CRP SERPL-MCNC: <0.29 MG/DL (ref ?–0.3)
DEPRECATED RDW RBC AUTO: 41.6 FL (ref 35.1–46.3)
EOSINOPHIL # BLD AUTO: 0.05 X10(3) UL (ref 0–0.7)
EOSINOPHIL NFR BLD AUTO: 0.8 %
ERYTHROCYTE [DISTWIDTH] IN BLOOD BY AUTOMATED COUNT: 13.6 % (ref 11–15)
GLOBULIN PLAS-MCNC: 3.7 G/DL (ref 2.8–4.4)
GLUCOSE BLD-MCNC: 91 MG/DL (ref 70–99)
HCT VFR BLD AUTO: 40.5 %
HGB BLD-MCNC: 13.6 G/DL
IMM GRANULOCYTES # BLD AUTO: 0.01 X10(3) UL (ref 0–1)
IMM GRANULOCYTES NFR BLD: 0.2 %
LYMPHOCYTES # BLD AUTO: 2.75 X10(3) UL (ref 1–4)
LYMPHOCYTES NFR BLD AUTO: 43.9 %
M PROTEIN MFR SERPL ELPH: 7.7 G/DL (ref 6.4–8.2)
MCH RBC QN AUTO: 28 PG (ref 26–34)
MCHC RBC AUTO-ENTMCNC: 33.6 G/DL (ref 31–37)
MCV RBC AUTO: 83.3 FL
MONOCYTES # BLD AUTO: 0.5 X10(3) UL (ref 0.1–1)
MONOCYTES NFR BLD AUTO: 8 %
NEUTROPHILS # BLD AUTO: 2.92 X10 (3) UL (ref 1.5–7.7)
NEUTROPHILS # BLD AUTO: 2.92 X10(3) UL (ref 1.5–7.7)
NEUTROPHILS NFR BLD AUTO: 46.6 %
OSMOLALITY SERPL CALC.SUM OF ELEC: 292 MOSM/KG (ref 275–295)
PATIENT FASTING Y/N/NP: YES
PLATELET # BLD AUTO: 357 10(3)UL (ref 150–450)
POTASSIUM SERPL-SCNC: 3.4 MMOL/L (ref 3.5–5.1)
RBC # BLD AUTO: 4.86 X10(6)UL
SODIUM SERPL-SCNC: 141 MMOL/L (ref 136–145)
WBC # BLD AUTO: 6.3 X10(3) UL (ref 4–11)

## 2021-03-18 PROCEDURE — 82306 VITAMIN D 25 HYDROXY: CPT

## 2021-03-18 PROCEDURE — 80053 COMPREHEN METABOLIC PANEL: CPT

## 2021-03-18 PROCEDURE — 86140 C-REACTIVE PROTEIN: CPT

## 2021-03-18 PROCEDURE — 36415 COLL VENOUS BLD VENIPUNCTURE: CPT

## 2021-03-18 PROCEDURE — 85025 COMPLETE CBC W/AUTO DIFF WBC: CPT

## 2021-03-18 NOTE — PROGRESS NOTES
HPI/Subjective:   Patient ID: Connie Amaro is a 48year old female. HPI  Lakshmiamarjit Ceja returns in follow-up today along with her . She was last seen at the time of flexible sigmoidoscopy in November 2020.     As per previous notes Lakshmi Ceja has a rem Readings from Last 6 Encounters:  03/17/21 : 194 lb (88 kg)  03/05/21 : 191 lb 12.8 oz (87 kg)  01/08/21 : 181 lb (82.1 kg)  11/13/20 : 164 lb (74.4 kg)  11/03/20 : 161 lb (73 kg)  10/29/20 : 161 lb (73 kg)      Current Outpatient Medications   Medication or rales. Abdominal:      General: Bowel sounds are normal. There is no distension. Palpations: Abdomen is soft. There is no mass. Tenderness: There is no abdominal tenderness. There is no guarding or rebound.    Musculoskeletal:      Cervical b

## 2021-03-18 NOTE — TELEPHONE ENCOUNTER
Alex Chaney returned my call and I was able to move her procedure to [facility] 6/30/2021 @ 9:15 am.  Patient agreed to new instructions beinf sent via [address] See TE 3/18/2021.     RE-Scheduled for:  Colonoscopy [address]23  Provider Name: Dr Curtis Beyer   Date: Braynt Rosa to:

## 2021-03-18 NOTE — TELEPHONE ENCOUNTER
I called and left a voicemail message for patient to return my call. Unfortunately I have to move her procedure to a different date due to anesthesia (MAC) not available on scheduled date and time.      Wed 6/30/2021 @ [facility] @ 9:15 am on Hold for this patien

## 2021-03-18 NOTE — TELEPHONE ENCOUNTER
Rodrigoia Maida returned my call and I was able to move her procedure to [facility] 6/30/2021 @ 9:15 am.  Patient agreed to new instructions beinf sent via [address] See TE 3/17/2021.   Closing this encounter

## 2021-03-19 DIAGNOSIS — E55.9 VITAMIN D DEFICIENCY: Primary | ICD-10-CM

## 2021-03-19 LAB — 25(OH)D3 SERPL-MCNC: 10.6 NG/ML (ref 30–100)

## 2021-03-19 RX ORDER — MULTIVIT-MIN/IRON/FOLIC ACID/K 18-600-40
1 CAPSULE ORAL WEEKLY
Qty: 30 CAPSULE | Refills: 1 | Status: SHIPPED | OUTPATIENT
Start: 2021-03-19 | End: 2021-04-18

## 2021-04-02 ENCOUNTER — TELEPHONE (OUTPATIENT)
Dept: GASTROENTEROLOGY | Facility: CLINIC | Age: 51
End: 2021-04-02

## 2021-04-02 NOTE — TELEPHONE ENCOUNTER
I believe this is an error, as there are no documented calls. All results were discussed with the patient by Dr. Anamaria Diego on 3/19.

## 2021-04-30 ENCOUNTER — OFFICE VISIT (OUTPATIENT)
Dept: HEMATOLOGY/ONCOLOGY | Facility: HOSPITAL | Age: 51
End: 2021-04-30
Attending: INTERNAL MEDICINE
Payer: COMMERCIAL

## 2021-04-30 VITALS
WEIGHT: 187 LBS | BODY MASS INDEX: 34.41 KG/M2 | OXYGEN SATURATION: 100 % | DIASTOLIC BLOOD PRESSURE: 71 MMHG | HEIGHT: 62 IN | TEMPERATURE: 99 F | HEART RATE: 75 BPM | SYSTOLIC BLOOD PRESSURE: 123 MMHG | RESPIRATION RATE: 16 BRPM

## 2021-04-30 DIAGNOSIS — K51.919 CHRONIC ULCERATIVE COLITIS, UNSPECIFIED COMPLICATION (HCC): Primary | ICD-10-CM

## 2021-04-30 PROCEDURE — 96413 CHEMO IV INFUSION 1 HR: CPT

## 2021-04-30 PROCEDURE — 96415 CHEMO IV INFUSION ADDL HR: CPT

## 2021-04-30 RX ORDER — ACETAMINOPHEN 325 MG/1
650 TABLET ORAL ONCE
Status: COMPLETED | OUTPATIENT
Start: 2021-04-30 | End: 2021-04-30

## 2021-04-30 RX ORDER — DIPHENHYDRAMINE HCL 25 MG
25 CAPSULE ORAL ONCE
Status: CANCELLED | OUTPATIENT
Start: 2021-06-25

## 2021-04-30 RX ORDER — DIPHENHYDRAMINE HCL 25 MG
CAPSULE ORAL
Status: COMPLETED
Start: 2021-04-30 | End: 2021-04-30

## 2021-04-30 RX ORDER — ACETAMINOPHEN 325 MG/1
650 TABLET ORAL ONCE
Status: CANCELLED | OUTPATIENT
Start: 2021-06-25

## 2021-04-30 RX ORDER — DIPHENHYDRAMINE HCL 25 MG
25 CAPSULE ORAL ONCE
Status: COMPLETED | OUTPATIENT
Start: 2021-04-30 | End: 2021-04-30

## 2021-04-30 RX ORDER — ACETAMINOPHEN 325 MG/1
TABLET ORAL
Status: COMPLETED
Start: 2021-04-30 | End: 2021-04-30

## 2021-04-30 RX ADMIN — DIPHENHYDRAMINE HCL 25 MG: 25 MG CAPSULE ORAL at 10:42:00

## 2021-04-30 RX ADMIN — ACETAMINOPHEN 650 MG: 325 TABLET ORAL at 10:41:00

## 2021-04-30 NOTE — PROGRESS NOTES
Pt to infusion for q 8 week Remicade for ulcerative colitis. Tolerated previous doses. Denies any infections, fevers,pain. She feels this medication has helped her GI symptoms with less frequency. Pre-medicated as ordered.  PIV to right Wrist    Infusion

## 2021-05-12 ENCOUNTER — TELEPHONE (OUTPATIENT)
Dept: SURGERY | Facility: CLINIC | Age: 51
End: 2021-05-12

## 2021-06-25 ENCOUNTER — OFFICE VISIT (OUTPATIENT)
Dept: HEMATOLOGY/ONCOLOGY | Facility: HOSPITAL | Age: 51
End: 2021-06-25
Attending: INTERNAL MEDICINE
Payer: COMMERCIAL

## 2021-06-25 VITALS
OXYGEN SATURATION: 96 % | RESPIRATION RATE: 16 BRPM | WEIGHT: 175.81 LBS | TEMPERATURE: 99 F | BODY MASS INDEX: 32 KG/M2 | DIASTOLIC BLOOD PRESSURE: 87 MMHG | HEART RATE: 59 BPM | SYSTOLIC BLOOD PRESSURE: 145 MMHG

## 2021-06-25 DIAGNOSIS — K51.919 CHRONIC ULCERATIVE COLITIS, UNSPECIFIED COMPLICATION (HCC): Primary | ICD-10-CM

## 2021-06-25 PROCEDURE — 96415 CHEMO IV INFUSION ADDL HR: CPT

## 2021-06-25 PROCEDURE — 96413 CHEMO IV INFUSION 1 HR: CPT

## 2021-06-25 RX ORDER — ACETAMINOPHEN 325 MG/1
650 TABLET ORAL ONCE
Status: COMPLETED | OUTPATIENT
Start: 2021-06-25 | End: 2021-06-25

## 2021-06-25 RX ORDER — DIPHENHYDRAMINE HCL 25 MG
CAPSULE ORAL
Status: COMPLETED
Start: 2021-06-25 | End: 2021-06-25

## 2021-06-25 RX ORDER — ACETAMINOPHEN 325 MG/1
TABLET ORAL
Status: COMPLETED
Start: 2021-06-25 | End: 2021-06-25

## 2021-06-25 RX ORDER — ACETAMINOPHEN 325 MG/1
650 TABLET ORAL ONCE
Status: CANCELLED | OUTPATIENT
Start: 2021-08-20

## 2021-06-25 RX ORDER — DIPHENHYDRAMINE HCL 25 MG
25 CAPSULE ORAL ONCE
Status: CANCELLED | OUTPATIENT
Start: 2021-08-20

## 2021-06-25 RX ORDER — DIPHENHYDRAMINE HCL 25 MG
25 CAPSULE ORAL ONCE
Status: COMPLETED | OUTPATIENT
Start: 2021-06-25 | End: 2021-06-25

## 2021-06-25 RX ADMIN — DIPHENHYDRAMINE HCL 25 MG: 25 MG CAPSULE ORAL at 10:49:00

## 2021-06-25 RX ADMIN — ACETAMINOPHEN 650 MG: 325 TABLET ORAL at 10:49:00

## 2021-06-25 NOTE — PROGRESS NOTES
Patient arrives for every 8  week remicade for ulcerative colitis. Patient reports she is well, reports the remicade is really helping with her UC symptoms. Patient denies any fever, chills, or infections.  PIV started in left AC, positive blood return note

## 2021-06-27 ENCOUNTER — LAB ENCOUNTER (OUTPATIENT)
Dept: LAB | Facility: HOSPITAL | Age: 51
End: 2021-06-27
Attending: INTERNAL MEDICINE
Payer: COMMERCIAL

## 2021-06-27 DIAGNOSIS — Z01.818 PRE-OP TESTING: ICD-10-CM

## 2021-06-27 LAB — SARS-COV-2 RNA RESP QL NAA+PROBE: NOT DETECTED

## 2021-06-30 ENCOUNTER — HOSPITAL ENCOUNTER (OUTPATIENT)
Facility: HOSPITAL | Age: 51
Setting detail: HOSPITAL OUTPATIENT SURGERY
Discharge: HOME | End: 2021-06-30
Attending: INTERNAL MEDICINE | Admitting: INTERNAL MEDICINE
Payer: COMMERCIAL

## 2021-06-30 ENCOUNTER — ANESTHESIA EVENT (OUTPATIENT)
Dept: ENDOSCOPY | Facility: HOSPITAL | Age: 51
End: 2021-06-30
Payer: COMMERCIAL

## 2021-06-30 ENCOUNTER — ANESTHESIA (OUTPATIENT)
Dept: ENDOSCOPY | Facility: HOSPITAL | Age: 51
End: 2021-06-30
Payer: COMMERCIAL

## 2021-06-30 VITALS
BODY MASS INDEX: 32.2 KG/M2 | DIASTOLIC BLOOD PRESSURE: 82 MMHG | RESPIRATION RATE: 15 BRPM | HEIGHT: 62 IN | SYSTOLIC BLOOD PRESSURE: 125 MMHG | WEIGHT: 175 LBS | OXYGEN SATURATION: 100 % | HEART RATE: 56 BPM

## 2021-06-30 DIAGNOSIS — K51.919 QUIESCENT ULCERATIVE COLITIS WITH COMPLICATION (HCC): ICD-10-CM

## 2021-06-30 DIAGNOSIS — K63.5 POLYP OF TRANSVERSE COLON, UNSPECIFIED TYPE: ICD-10-CM

## 2021-06-30 DIAGNOSIS — Z01.818 PRE-OP TESTING: Primary | ICD-10-CM

## 2021-06-30 DIAGNOSIS — K57.90 DIVERTICULOSIS: ICD-10-CM

## 2021-06-30 DIAGNOSIS — Z87.19 HX OF CROHN'S DISEASE: ICD-10-CM

## 2021-06-30 PROCEDURE — 0DBP8ZX EXCISION OF RECTUM, VIA NATURAL OR ARTIFICIAL OPENING ENDOSCOPIC, DIAGNOSTIC: ICD-10-PCS | Performed by: INTERNAL MEDICINE

## 2021-06-30 PROCEDURE — 0DBN8ZX EXCISION OF SIGMOID COLON, VIA NATURAL OR ARTIFICIAL OPENING ENDOSCOPIC, DIAGNOSTIC: ICD-10-PCS | Performed by: INTERNAL MEDICINE

## 2021-06-30 PROCEDURE — 0DBM8ZX EXCISION OF DESCENDING COLON, VIA NATURAL OR ARTIFICIAL OPENING ENDOSCOPIC, DIAGNOSTIC: ICD-10-PCS | Performed by: INTERNAL MEDICINE

## 2021-06-30 PROCEDURE — 0DBB8ZX EXCISION OF ILEUM, VIA NATURAL OR ARTIFICIAL OPENING ENDOSCOPIC, DIAGNOSTIC: ICD-10-PCS | Performed by: INTERNAL MEDICINE

## 2021-06-30 PROCEDURE — 0DBL8ZX EXCISION OF TRANSVERSE COLON, VIA NATURAL OR ARTIFICIAL OPENING ENDOSCOPIC, DIAGNOSTIC: ICD-10-PCS | Performed by: INTERNAL MEDICINE

## 2021-06-30 PROCEDURE — 45380 COLONOSCOPY AND BIOPSY: CPT | Performed by: INTERNAL MEDICINE

## 2021-06-30 RX ORDER — SODIUM CHLORIDE, SODIUM LACTATE, POTASSIUM CHLORIDE, CALCIUM CHLORIDE 600; 310; 30; 20 MG/100ML; MG/100ML; MG/100ML; MG/100ML
INJECTION, SOLUTION INTRAVENOUS CONTINUOUS
Status: DISCONTINUED | OUTPATIENT
Start: 2021-06-30 | End: 2021-06-30

## 2021-06-30 RX ORDER — LIDOCAINE HYDROCHLORIDE 10 MG/ML
INJECTION, SOLUTION EPIDURAL; INFILTRATION; INTRACAUDAL; PERINEURAL AS NEEDED
Status: DISCONTINUED | OUTPATIENT
Start: 2021-06-30 | End: 2021-06-30 | Stop reason: SURG

## 2021-06-30 RX ORDER — NALOXONE HYDROCHLORIDE 0.4 MG/ML
80 INJECTION, SOLUTION INTRAMUSCULAR; INTRAVENOUS; SUBCUTANEOUS AS NEEDED
Status: DISCONTINUED | OUTPATIENT
Start: 2021-06-30 | End: 2021-06-30

## 2021-06-30 RX ADMIN — SODIUM CHLORIDE, SODIUM LACTATE, POTASSIUM CHLORIDE, CALCIUM CHLORIDE: 600; 310; 30; 20 INJECTION, SOLUTION INTRAVENOUS at 09:34:00

## 2021-06-30 RX ADMIN — LIDOCAINE HYDROCHLORIDE 50 MG: 10 INJECTION, SOLUTION EPIDURAL; INFILTRATION; INTRACAUDAL; PERINEURAL at 09:37:00

## 2021-06-30 NOTE — OPERATIVE REPORT
[facility] Endoscopy Report      Date of Procedure:  06/30/21      Preoperative Diagnosis:  Colonic inflammatory bowel disease      Postoperative Diagnosis:  1. Quiescent colitis with multiple pseudopolyps  2.   Diverticulosis left colon recumbent position. Findings:  The preparation of the colon was good. The terminal ileum was examined for several cm and visually normal.  Biopsies were obtained. The ileocecal valve was well preserved.  The visualized colonic mucosa from the cecum t

## 2021-06-30 NOTE — ANESTHESIA POSTPROCEDURE EVALUATION
Patient: Bud Chacko    Procedure Summary     Date: 06/30/21 Room / Location: [address] [place] [address] ENDOSCOPY 04 / [address] [place] [address] ENDOSCOPY    Anesthesia Start: 5973 Anesthesia Stop:     Procedure: COLONOSCOPY (N/A ) Diagnosis:       Hx of Crohn's disease      (polyps, quiesc

## 2021-06-30 NOTE — H&P
CERTIFICATE OF WORK    9/19/2019      Re:   Paulina Prater   [address] [place] [id number]      This is to certify that Paulina Prater has been under my care from 9/19/2019 and can return to regular work on 09/22/2019.    RESTRICTIONS: NONE      REMARKS: NONE        SIGNATURE:___________________________________________,   9/19/2019      Felice Carson MD          [place] [facility]  [facility]  [address] [place], [place] [address] [id number] [phone]             History & Physical Examination    Patient Name: Manda Gabriel  MRN: [id number]  CSN: [id number]  YOB: 1970    Diagnosis: Colonic inflammatory bowel disease      predniSONE 5 MG Oral Tab, 20 mg daily.   Taper by 2.5 mg weekly (Patient ta colitis) ([facility] [address] [address]    • Ulcerative colitis ([facility]    • Unspecified essential hypertension      Past Surgical History:   Procedure Laterality Date   • COLONOSCOPY  3/26/2013    per NG   • COLONOSCOPY  2007    Colonoscopy with biopsy   • COLONOSCOPY N/A 9/21/2020

## 2021-06-30 NOTE — ANESTHESIA PREPROCEDURE EVALUATION
Anesthesia PreOp Note    HPI:     Sal Bowles is a 48year old female who presents for preoperative consultation requested by: Zeina Couch MD    Date of Surgery: 6/30/2021    Procedure(s):  COLONOSCOPY  Indication: Hx of Crohn's disease Ulcerative colitis ([facility] [address]    • Unspecified essential hypertension        Past Surgical History:   Procedure Laterality Date   • COLONOSCOPY  3/26/2013    per NG   • COLONOSCOPY  2007    Colonoscopy with biopsy   • COLONOSCOPY N/A 9/21/2020    Procedure: COL CAD     Social History    Socioeconomic History      Marital status:       Spouse name: Not on file      Number of children: Not on file      Years of education: Not on file      Highest education level: Not on file    Occupational History Nursing notes reviewed    Airway   Mallampati: II  TM distance: >3 FB  Neck ROM: full  Dental - normal exam     Pulmonary     breath sounds clear to auscultation  (+) asthma,     ROS comment: MILTON resolved since gastric bypass  Cardiovascular   Exercise best

## 2021-07-06 ENCOUNTER — TELEPHONE (OUTPATIENT)
Dept: GASTROENTEROLOGY | Facility: CLINIC | Age: 51
End: 2021-07-06

## 2021-07-06 NOTE — TELEPHONE ENCOUNTER
Recall colon in 2 years per Dr. Chon Hernandez. Last done:6/30/21  Next due:6/30/23    Updated health maintenance and pt outreach.

## 2021-07-06 NOTE — TELEPHONE ENCOUNTER
----- Message from Rubina Tierney MD sent at 7/3/2021  1:46 PM CDT -----  As per MARSHAL parameters I left a message on the patient's cell phone voicemail. The biopsies show no sign of dysplasia (precancerous changes).   There is very minimal microscopic

## 2021-07-20 ENCOUNTER — TELEPHONE (OUTPATIENT)
Dept: GASTROENTEROLOGY | Facility: CLINIC | Age: 51
End: 2021-07-20

## 2021-08-20 ENCOUNTER — APPOINTMENT (OUTPATIENT)
Dept: HEMATOLOGY/ONCOLOGY | Facility: HOSPITAL | Age: 51
End: 2021-08-20
Attending: INTERNAL MEDICINE
Payer: COMMERCIAL

## 2021-08-25 ENCOUNTER — OFFICE VISIT (OUTPATIENT)
Dept: HEMATOLOGY/ONCOLOGY | Facility: HOSPITAL | Age: 51
End: 2021-08-25
Attending: INTERNAL MEDICINE
Payer: COMMERCIAL

## 2021-08-25 VITALS
DIASTOLIC BLOOD PRESSURE: 85 MMHG | HEART RATE: 81 BPM | BODY MASS INDEX: 30 KG/M2 | RESPIRATION RATE: 16 BRPM | WEIGHT: 166.5 LBS | SYSTOLIC BLOOD PRESSURE: 137 MMHG | OXYGEN SATURATION: 97 % | TEMPERATURE: 98 F

## 2021-08-25 DIAGNOSIS — K51.919 CHRONIC ULCERATIVE COLITIS, UNSPECIFIED COMPLICATION (HCC): Primary | ICD-10-CM

## 2021-08-25 PROCEDURE — 96415 CHEMO IV INFUSION ADDL HR: CPT

## 2021-08-25 PROCEDURE — 96413 CHEMO IV INFUSION 1 HR: CPT

## 2021-08-25 RX ORDER — ACETAMINOPHEN 325 MG/1
650 TABLET ORAL ONCE
Status: CANCELLED | OUTPATIENT
Start: 2021-10-13

## 2021-08-25 RX ORDER — DIPHENHYDRAMINE HCL 25 MG
CAPSULE ORAL
Status: COMPLETED
Start: 2021-08-25 | End: 2021-08-25

## 2021-08-25 RX ORDER — DIPHENHYDRAMINE HCL 25 MG
25 CAPSULE ORAL ONCE
Status: CANCELLED | OUTPATIENT
Start: 2021-10-13

## 2021-08-25 RX ORDER — DIPHENHYDRAMINE HCL 25 MG
25 CAPSULE ORAL ONCE
Status: COMPLETED | OUTPATIENT
Start: 2021-08-25 | End: 2021-08-25

## 2021-08-25 RX ORDER — ACETAMINOPHEN 325 MG/1
650 TABLET ORAL ONCE
Status: COMPLETED | OUTPATIENT
Start: 2021-08-25 | End: 2021-08-25

## 2021-08-25 RX ORDER — ACETAMINOPHEN 325 MG/1
TABLET ORAL
Status: COMPLETED
Start: 2021-08-25 | End: 2021-08-25

## 2021-08-25 RX ADMIN — ACETAMINOPHEN 650 MG: 325 TABLET ORAL at 10:48:00

## 2021-08-25 RX ADMIN — DIPHENHYDRAMINE HCL 25 MG: 25 MG CAPSULE ORAL at 10:48:00

## 2021-08-25 NOTE — PROGRESS NOTES
Patient arrives for every 8  week remicade for ulcerative colitis. Patient reports she is mostly well, reports the remicade is really helping with her UC symptoms.   She does state that she started feeling crampy this weekend and she knows its time for her

## 2021-09-25 ENCOUNTER — HOSPITAL ENCOUNTER (OUTPATIENT)
Age: 51
Discharge: HOME | End: 2021-09-25
Payer: COMMERCIAL

## 2021-09-25 VITALS
TEMPERATURE: 99 F | HEART RATE: 76 BPM | SYSTOLIC BLOOD PRESSURE: 130 MMHG | DIASTOLIC BLOOD PRESSURE: 80 MMHG | RESPIRATION RATE: 18 BRPM | OXYGEN SATURATION: 100 %

## 2021-09-25 DIAGNOSIS — J02.0 STREPTOCOCCAL SORE THROAT: ICD-10-CM

## 2021-09-25 DIAGNOSIS — U07.1 COVID-19 VIRUS DETECTED: Primary | ICD-10-CM

## 2021-09-25 DIAGNOSIS — Z20.822 ENCOUNTER FOR LABORATORY TESTING FOR COVID-19 VIRUS: ICD-10-CM

## 2021-09-25 LAB
S PYO AG THROAT QL: POSITIVE
SARS-COV-2 RNA RESP QL NAA+PROBE: DETECTED

## 2021-09-25 PROCEDURE — 99204 OFFICE O/P NEW MOD 45 MIN: CPT | Performed by: NURSE PRACTITIONER

## 2021-09-25 PROCEDURE — U0002 COVID-19 LAB TEST NON-CDC: HCPCS | Performed by: NURSE PRACTITIONER

## 2021-09-25 PROCEDURE — 87880 STREP A ASSAY W/OPTIC: CPT | Performed by: NURSE PRACTITIONER

## 2021-09-25 RX ORDER — PENICILLIN V POTASSIUM 500 MG/1
500 TABLET ORAL 2 TIMES DAILY
Qty: 20 TABLET | Refills: 0 | Status: SHIPPED | OUTPATIENT
Start: 2021-09-25 | End: 2021-10-05

## 2021-09-25 NOTE — ED PROVIDER NOTES
Patient Seen in: [facility]      History   Patient presents with:  Testing    Stated Complaint: COVID TEST    Subjective:   Well-appearing 77-year-old female presents with concerns of a fever from Monday through Wednesday, now resolved, sore No tonsillar exudate. Tonsil on the right is 1+, tonsil on the left is 1+. Neck: No cervical lymphadenopathy. Supple. Normal ROM. Heart: Regular rate and rhythm, normal S1, normal S2.    Lungs: Clear to auscultation.  Good inspiratory effort. + Airw Prescribed:  Discharge Medication List as of 9/25/2021  1:41 PM    START taking these medications    penicillin v potassium 500 MG Oral Tab  Take 1 tablet (500 mg total) by mouth 2 (two) times a day for 10 days. , Normal, Disp-20 tablet, R-0

## 2021-09-25 NOTE — ED INITIAL ASSESSMENT (HPI)
Pt states having a fever that began on Monday that had subsided by Wednesday. Pt states having loss of smell and taste that began on Monday. Pt states needing her albuterol pump more often this week.

## 2021-10-20 ENCOUNTER — APPOINTMENT (OUTPATIENT)
Dept: HEMATOLOGY/ONCOLOGY | Facility: HOSPITAL | Age: 51
End: 2021-10-20
Attending: INTERNAL MEDICINE
Payer: COMMERCIAL

## 2021-10-20 ENCOUNTER — TELEPHONE (OUTPATIENT)
Dept: GASTROENTEROLOGY | Facility: CLINIC | Age: 51
End: 2021-10-20

## 2021-10-20 RX ORDER — ACETAMINOPHEN 325 MG/1
650 TABLET ORAL EVERY 6 HOURS PRN
OUTPATIENT
Start: 2021-10-20

## 2021-10-20 RX ORDER — ACETAMINOPHEN 325 MG/1
650 TABLET ORAL ONCE
Status: CANCELLED | OUTPATIENT
Start: 2021-10-20

## 2021-10-20 RX ORDER — DIPHENHYDRAMINE HCL 25 MG
25 CAPSULE ORAL ONCE
Status: CANCELLED | OUTPATIENT
Start: 2021-10-20

## 2021-10-20 RX ORDER — DIPHENHYDRAMINE HYDROCHLORIDE 50 MG/ML
25 INJECTION INTRAMUSCULAR; INTRAVENOUS ONCE
OUTPATIENT
Start: 2021-10-20

## 2021-10-20 NOTE — PROGRESS NOTES
Pt here for Remicade. Denies any infections, fevers,pain. Appeared to tolerate infusion, no s/s of rxn noted. Discharged home ambulating independently.

## 2021-10-20 NOTE — TELEPHONE ENCOUNTER
Dr. Yobani Carter,    Call received from Noelle from infusion center stating patient's Remicade orders have  ([facility] is still good). Patient has infusion scheduled for tomorrow. Orders pended in therapy plan. Please review and sign, thank you!

## 2021-10-21 ENCOUNTER — OFFICE VISIT (OUTPATIENT)
Dept: HEMATOLOGY/ONCOLOGY | Facility: HOSPITAL | Age: 51
End: 2021-10-21
Attending: INTERNAL MEDICINE
Payer: COMMERCIAL

## 2021-10-21 VITALS
WEIGHT: 164.63 LBS | DIASTOLIC BLOOD PRESSURE: 75 MMHG | SYSTOLIC BLOOD PRESSURE: 127 MMHG | BODY MASS INDEX: 30 KG/M2 | RESPIRATION RATE: 16 BRPM | HEART RATE: 52 BPM | TEMPERATURE: 98 F

## 2021-10-21 DIAGNOSIS — K51.919 CHRONIC ULCERATIVE COLITIS, UNSPECIFIED COMPLICATION (HCC): Primary | ICD-10-CM

## 2021-10-21 PROCEDURE — 96415 CHEMO IV INFUSION ADDL HR: CPT

## 2021-10-21 PROCEDURE — 96413 CHEMO IV INFUSION 1 HR: CPT

## 2021-10-21 RX ORDER — DIPHENHYDRAMINE HCL 25 MG
CAPSULE ORAL
Status: COMPLETED
Start: 2021-10-21 | End: 2021-10-21

## 2021-10-21 RX ORDER — DIPHENHYDRAMINE HCL 25 MG
25 CAPSULE ORAL ONCE
OUTPATIENT
Start: 2021-12-16

## 2021-10-21 RX ORDER — DIPHENHYDRAMINE HYDROCHLORIDE 50 MG/ML
25 INJECTION INTRAMUSCULAR; INTRAVENOUS ONCE
OUTPATIENT
Start: 2021-12-16

## 2021-10-21 RX ORDER — DIPHENHYDRAMINE HCL 25 MG
25 CAPSULE ORAL ONCE
Status: COMPLETED | OUTPATIENT
Start: 2021-10-21 | End: 2021-10-21

## 2021-10-21 RX ORDER — ACETAMINOPHEN 325 MG/1
650 TABLET ORAL ONCE
OUTPATIENT
Start: 2021-12-16

## 2021-10-21 RX ORDER — ACETAMINOPHEN 325 MG/1
650 TABLET ORAL EVERY 6 HOURS PRN
OUTPATIENT
Start: 2021-12-16

## 2021-10-21 RX ORDER — ACETAMINOPHEN 325 MG/1
650 TABLET ORAL ONCE
Status: COMPLETED | OUTPATIENT
Start: 2021-10-21 | End: 2021-10-21

## 2021-10-21 RX ORDER — ACETAMINOPHEN 325 MG/1
TABLET ORAL
Status: COMPLETED
Start: 2021-10-21 | End: 2021-10-21

## 2021-10-21 RX ADMIN — DIPHENHYDRAMINE HCL 25 MG: 25 MG CAPSULE ORAL at 08:35:00

## 2021-10-21 RX ADMIN — ACETAMINOPHEN 650 MG: 325 TABLET ORAL at 08:35:00

## 2021-10-26 ENCOUNTER — HOSPITAL ENCOUNTER (EMERGENCY)
Facility: HOSPITAL | Age: 51
Discharge: HOME | End: 2021-10-27
Attending: EMERGENCY MEDICINE
Payer: COMMERCIAL

## 2021-10-26 VITALS
RESPIRATION RATE: 15 BRPM | SYSTOLIC BLOOD PRESSURE: 160 MMHG | HEIGHT: 62 IN | BODY MASS INDEX: 30.36 KG/M2 | OXYGEN SATURATION: 98 % | WEIGHT: 165 LBS | TEMPERATURE: 98 F | DIASTOLIC BLOOD PRESSURE: 97 MMHG | HEART RATE: 60 BPM

## 2021-10-26 DIAGNOSIS — S05.01XA RIGHT CORNEAL ABRASION, INITIAL ENCOUNTER: Primary | ICD-10-CM

## 2021-10-26 PROCEDURE — 99283 EMERGENCY DEPT VISIT LOW MDM: CPT

## 2021-10-26 PROCEDURE — 90471 IMMUNIZATION ADMIN: CPT

## 2021-10-26 RX ORDER — TETRACAINE HYDROCHLORIDE 5 MG/ML
SOLUTION OPHTHALMIC
Status: COMPLETED
Start: 2021-10-26 | End: 2021-10-26

## 2021-10-27 RX ORDER — OFLOXACIN 3 MG/ML
1 SOLUTION/ DROPS OPHTHALMIC EVERY 4 HOURS
Qty: 1 EACH | Refills: 0 | Status: SHIPPED | OUTPATIENT
Start: 2021-10-27

## 2021-10-27 NOTE — ED INITIAL ASSESSMENT (HPI)
Pt ambulatory into triage with steady gait. Pt states that she was taking a pin out of a trailer at work and was looking at it from below and got some dirt in her rt eye and is continuing to have irritation, feels like something is in her eye.  States quoc

## 2021-10-27 NOTE — ED PROVIDER NOTES
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 Constitutional and vital signs reviewed. All other systems reviewed and negative except as noted above.     Physical Exam discussed with patient including need for follow up                               Disposition and Plan     Clinical Impression:  Right corneal abrasion, initial encounter  (primary encounter diagnosis)     Disposition:  Discharge  10/26/2021 11:32 pm    Fo

## 2021-11-18 NOTE — TELEPHONE ENCOUNTER
Please review; protocol failed/no protocol    Requested Prescriptions   Pending Prescriptions Disp Refills    TRAMADOL 50 MG Oral Tab [Pharmacy Med Name: TRAMADOL 50MG TABLETS] 30 tablet 0     Sig: TAKE 1 TABLET(50 MG) BY MOUTH EVERY 6 HOURS AS NEEDED

## 2021-11-20 RX ORDER — TRAMADOL HYDROCHLORIDE 50 MG/1
50 TABLET ORAL EVERY 6 HOURS PRN
Qty: 30 TABLET | Refills: 0 | Status: SHIPPED | OUTPATIENT
Start: 2021-11-20

## 2022-02-10 ENCOUNTER — APPOINTMENT (OUTPATIENT)
Dept: HEMATOLOGY/ONCOLOGY | Facility: HOSPITAL | Age: 52
End: 2022-02-10
Attending: INTERNAL MEDICINE
Payer: COMMERCIAL

## 2022-02-17 ENCOUNTER — TELEPHONE (OUTPATIENT)
Dept: HEMATOLOGY/ONCOLOGY | Facility: HOSPITAL | Age: 52
End: 2022-02-17

## 2022-02-17 NOTE — TELEPHONE ENCOUNTER
Patient came in this morning for her infusion. I let her know she is scheduled for tomorrow 2/18 at 7:30am. She says she will not be able to make it, and asking to reschedule her infusion to the morning of 2/22. I let her know the  was not in yet, but I would put in a message.

## 2022-02-18 ENCOUNTER — APPOINTMENT (OUTPATIENT)
Dept: HEMATOLOGY/ONCOLOGY | Facility: HOSPITAL | Age: 52
End: 2022-02-18
Attending: INTERNAL MEDICINE
Payer: COMMERCIAL

## 2022-02-22 ENCOUNTER — TELEPHONE (OUTPATIENT)
Dept: HEMATOLOGY/ONCOLOGY | Facility: HOSPITAL | Age: 52
End: 2022-02-22

## 2022-02-22 NOTE — TELEPHONE ENCOUNTER
I called Kailey Joyner left a message she was a no show to her 8:30 am appointment this morning, asked her to call back and reschedule when available.

## 2022-04-07 ENCOUNTER — APPOINTMENT (OUTPATIENT)
Dept: HEMATOLOGY/ONCOLOGY | Facility: HOSPITAL | Age: 52
End: 2022-04-07
Attending: INTERNAL MEDICINE
Payer: COMMERCIAL

## 2023-03-08 ENCOUNTER — TELEPHONE (OUTPATIENT)
Facility: CLINIC | Age: 53
End: 2023-03-08

## 2023-03-08 NOTE — TELEPHONE ENCOUNTER
Patient outreach message received:    Recall colon in 2 years per Dr. Jairo Ireland. Last done:6/30/21  Next due:6/30/23    Recall reminder letter mailed out to patient.

## 2023-04-05 ENCOUNTER — TELEPHONE (OUTPATIENT)
Dept: FAMILY MEDICINE CLINIC | Facility: CLINIC | Age: 53
End: 2023-04-05

## 2023-04-05 NOTE — TELEPHONE ENCOUNTER
Left message to call back-transfer to triage. TDap on 10/26/2021(looks like it was at an ER visit)- good for 10 years. [employer] message sent.

## 2023-04-05 NOTE — TELEPHONE ENCOUNTER
Patient called requesting a whopping vaccination, patient daughter is expected to go into labor on 04/20. Requesting a call back with clarification with injections.  Has new patient px scheduled 04/18

## 2023-04-14 ENCOUNTER — OFFICE VISIT (OUTPATIENT)
Dept: FAMILY MEDICINE CLINIC | Facility: CLINIC | Age: 53
End: 2023-04-14

## 2023-04-14 VITALS
HEART RATE: 76 BPM | HEIGHT: 62 IN | RESPIRATION RATE: 18 BRPM | OXYGEN SATURATION: 98 % | WEIGHT: 171 LBS | BODY MASS INDEX: 31.47 KG/M2 | DIASTOLIC BLOOD PRESSURE: 78 MMHG | SYSTOLIC BLOOD PRESSURE: 122 MMHG

## 2023-04-14 DIAGNOSIS — Z00.00 ANNUAL PHYSICAL EXAM: ICD-10-CM

## 2023-04-14 DIAGNOSIS — Z12.31 ENCOUNTER FOR SCREENING MAMMOGRAM FOR BREAST CANCER: Primary | ICD-10-CM

## 2023-04-14 DIAGNOSIS — Z12.11 SCREENING FOR COLON CANCER: ICD-10-CM

## 2023-04-14 PROCEDURE — 3008F BODY MASS INDEX DOCD: CPT | Performed by: FAMILY MEDICINE

## 2023-04-14 PROCEDURE — 3074F SYST BP LT 130 MM HG: CPT | Performed by: FAMILY MEDICINE

## 2023-04-14 PROCEDURE — 99396 PREV VISIT EST AGE 40-64: CPT | Performed by: FAMILY MEDICINE

## 2023-04-14 PROCEDURE — 3078F DIAST BP <80 MM HG: CPT | Performed by: FAMILY MEDICINE

## 2024-07-09 ENCOUNTER — TELEPHONE (OUTPATIENT)
Dept: CASE MANAGEMENT | Age: 54
End: 2024-07-09

## 2024-07-09 NOTE — TELEPHONE ENCOUNTER
Left patient message to call office to schedule Hypertension follow-up appointment, first attempt.  Patient called right back and scheduled

## 2024-08-13 ENCOUNTER — TELEPHONE (OUTPATIENT)
Dept: INTERNAL MEDICINE UNIT | Facility: HOSPITAL | Age: 54
End: 2024-08-13

## 2024-09-05 ENCOUNTER — OFFICE VISIT (OUTPATIENT)
Dept: FAMILY MEDICINE CLINIC | Facility: CLINIC | Age: 54
End: 2024-09-05

## 2024-09-05 ENCOUNTER — LAB ENCOUNTER (OUTPATIENT)
Dept: LAB | Age: 54
End: 2024-09-05
Attending: FAMILY MEDICINE
Payer: COMMERCIAL

## 2024-09-05 ENCOUNTER — PATIENT MESSAGE (OUTPATIENT)
Dept: ADMINISTRATIVE | Age: 54
End: 2024-09-05

## 2024-09-05 VITALS
WEIGHT: 191.38 LBS | HEART RATE: 81 BPM | DIASTOLIC BLOOD PRESSURE: 84 MMHG | HEIGHT: 62 IN | BODY MASS INDEX: 35.22 KG/M2 | SYSTOLIC BLOOD PRESSURE: 121 MMHG | OXYGEN SATURATION: 100 %

## 2024-09-05 DIAGNOSIS — Z12.31 SCREENING MAMMOGRAM FOR BREAST CANCER: Primary | ICD-10-CM

## 2024-09-05 DIAGNOSIS — Z00.00 ANNUAL PHYSICAL EXAM: ICD-10-CM

## 2024-09-05 DIAGNOSIS — N28.9 RENAL INSUFFICIENCY: Primary | ICD-10-CM

## 2024-09-05 DIAGNOSIS — Z12.11 SCREENING FOR COLON CANCER: ICD-10-CM

## 2024-09-05 LAB
ALBUMIN SERPL-MCNC: 4.3 G/DL (ref 3.2–4.8)
ALBUMIN/GLOB SERPL: 1.5 {RATIO} (ref 1–2)
ALP LIVER SERPL-CCNC: 86 U/L
ALT SERPL-CCNC: 11 U/L
ANION GAP SERPL CALC-SCNC: 8 MMOL/L (ref 0–18)
AST SERPL-CCNC: 21 U/L (ref ?–34)
BASOPHILS # BLD AUTO: 0.08 X10(3) UL (ref 0–0.2)
BASOPHILS NFR BLD AUTO: 1.3 %
BILIRUB SERPL-MCNC: 0.3 MG/DL (ref 0.3–1.2)
BUN BLD-MCNC: 13 MG/DL (ref 9–23)
BUN/CREAT SERPL: 10.7 (ref 10–20)
CALCIUM BLD-MCNC: 9.3 MG/DL (ref 8.7–10.4)
CHLORIDE SERPL-SCNC: 109 MMOL/L (ref 98–112)
CHOLEST SERPL-MCNC: 175 MG/DL (ref ?–200)
CO2 SERPL-SCNC: 26 MMOL/L (ref 21–32)
CREAT BLD-MCNC: 1.22 MG/DL
DEPRECATED RDW RBC AUTO: 44.5 FL (ref 35.1–46.3)
EGFRCR SERPLBLD CKD-EPI 2021: 53 ML/MIN/1.73M2 (ref 60–?)
EOSINOPHIL # BLD AUTO: 0.13 X10(3) UL (ref 0–0.7)
EOSINOPHIL NFR BLD AUTO: 2.2 %
ERYTHROCYTE [DISTWIDTH] IN BLOOD BY AUTOMATED COUNT: 16.5 % (ref 11–15)
FASTING PATIENT LIPID ANSWER: NO
FASTING STATUS PATIENT QL REPORTED: NO
GLOBULIN PLAS-MCNC: 2.9 G/DL (ref 2–3.5)
GLUCOSE BLD-MCNC: 79 MG/DL (ref 70–99)
HCT VFR BLD AUTO: 38.8 %
HDLC SERPL-MCNC: 58 MG/DL (ref 40–59)
HGB BLD-MCNC: 12 G/DL
IMM GRANULOCYTES # BLD AUTO: 0.01 X10(3) UL (ref 0–1)
IMM GRANULOCYTES NFR BLD: 0.2 %
LDLC SERPL CALC-MCNC: 97 MG/DL (ref ?–100)
LYMPHOCYTES # BLD AUTO: 1.83 X10(3) UL (ref 1–4)
LYMPHOCYTES NFR BLD AUTO: 30.6 %
MCH RBC QN AUTO: 23.5 PG (ref 26–34)
MCHC RBC AUTO-ENTMCNC: 30.9 G/DL (ref 31–37)
MCV RBC AUTO: 75.9 FL
MONOCYTES # BLD AUTO: 0.51 X10(3) UL (ref 0.1–1)
MONOCYTES NFR BLD AUTO: 8.5 %
NEUTROPHILS # BLD AUTO: 3.43 X10 (3) UL (ref 1.5–7.7)
NEUTROPHILS # BLD AUTO: 3.43 X10(3) UL (ref 1.5–7.7)
NEUTROPHILS NFR BLD AUTO: 57.2 %
NONHDLC SERPL-MCNC: 117 MG/DL (ref ?–130)
OSMOLALITY SERPL CALC.SUM OF ELEC: 295 MOSM/KG (ref 275–295)
PLATELET # BLD AUTO: 365 10(3)UL (ref 150–450)
POTASSIUM SERPL-SCNC: 4.3 MMOL/L (ref 3.5–5.1)
PROT SERPL-MCNC: 7.2 G/DL (ref 5.7–8.2)
RBC # BLD AUTO: 5.11 X10(6)UL
SODIUM SERPL-SCNC: 143 MMOL/L (ref 136–145)
TRIGL SERPL-MCNC: 113 MG/DL (ref 30–149)
TSI SER-ACNC: 2.5 MIU/ML (ref 0.55–4.78)
VLDLC SERPL CALC-MCNC: 19 MG/DL (ref 0–30)
WBC # BLD AUTO: 6 X10(3) UL (ref 4–11)

## 2024-09-05 PROCEDURE — 84443 ASSAY THYROID STIM HORMONE: CPT

## 2024-09-05 PROCEDURE — 85025 COMPLETE CBC W/AUTO DIFF WBC: CPT

## 2024-09-05 PROCEDURE — 3074F SYST BP LT 130 MM HG: CPT | Performed by: FAMILY MEDICINE

## 2024-09-05 PROCEDURE — 80061 LIPID PANEL: CPT

## 2024-09-05 PROCEDURE — 3079F DIAST BP 80-89 MM HG: CPT | Performed by: FAMILY MEDICINE

## 2024-09-05 PROCEDURE — 3008F BODY MASS INDEX DOCD: CPT | Performed by: FAMILY MEDICINE

## 2024-09-05 PROCEDURE — 36415 COLL VENOUS BLD VENIPUNCTURE: CPT

## 2024-09-05 PROCEDURE — 99396 PREV VISIT EST AGE 40-64: CPT | Performed by: FAMILY MEDICINE

## 2024-09-05 PROCEDURE — 80053 COMPREHEN METABOLIC PANEL: CPT

## 2024-09-05 RX ORDER — HYDROCHLOROTHIAZIDE 25 MG/1
25 TABLET ORAL DAILY
Qty: 90 TABLET | Refills: 1 | Status: SHIPPED | OUTPATIENT
Start: 2024-09-05 | End: 2024-09-05

## 2024-09-05 RX ORDER — HYDROCHLOROTHIAZIDE 25 MG/1
25 TABLET ORAL DAILY
Qty: 90 TABLET | Refills: 1 | Status: SHIPPED | OUTPATIENT
Start: 2024-09-05

## 2024-09-05 RX ORDER — ALBUTEROL SULFATE 90 UG/1
2 AEROSOL, METERED RESPIRATORY (INHALATION) EVERY 6 HOURS PRN
Qty: 1 EACH | Refills: 2 | Status: SHIPPED | OUTPATIENT
Start: 2024-09-05

## 2024-09-05 NOTE — PROGRESS NOTES
Subjective:   Patient ID: Anthony Trinh is a 54 year old female.    HPI  Patient here for annual physical and f/u hypertension   Had a lot of stress lately also   History/Other:   Review of Systems    Constitutional: Negative.  Negative for activity change, appetite change, diaphoresis and fatigue.     Respiratory: Negative.  Negative for apnea, cough, chest tightness and shortness of breath.    Cardiovascular: Negative.  Negative for chest pain, palpitations and leg swelling.   Gastrointestinal: Negative.  Negative for abdominal pain.   Skin: Negative.             Current Outpatient Medications   Medication Sig Dispense Refill    hydroCHLOROthiazide 25 MG Oral Tab Take 1 tablet (25 mg total) by mouth daily. 90 tablet 1    albuterol (PROVENTIL HFA) 108 (90 Base) MCG/ACT Inhalation Aero Soln Inhale 2 puffs into the lungs every 6 (six) hours as needed for Wheezing. 1 each 2    traMADol 50 MG Oral Tab Take 1 tablet (50 mg total) by mouth every 6 (six) hours as needed. 30 tablet 0    Ferrous Sulfate (FEOSOL) 325 (65 Fe) MG Oral Tab Take 1 tablet (325 mg total) by mouth 2 (two) times daily. 60 tablet 2    VITAMIN D, CHOLECALCIFEROL, OR Take by mouth.       Allergies:  Allergies   Allergen Reactions    Seasonal Runny nose       Objective:   Physical Exam  Constitutional:       Appearance: She is well-developed.   Cardiovascular:      Rate and Rhythm: Normal rate and regular rhythm.      Heart sounds: Normal heart sounds.   Pulmonary:      Effort: Pulmonary effort is normal.      Breath sounds: Normal breath sounds.   Abdominal:      General: Bowel sounds are normal.      Palpations: Abdomen is soft.   Skin:     General: Skin is warm and dry.         Assessment & Plan:   1. Screening mammogram for breast cancer    2. Screening for colon cancer    3. Annual physical exam    Diet and exercise discussed  4. Hypertension controlled cpm  Orders Placed This Encounter   Procedures    Comp Metabolic Panel (14)    Lipid Panel     Assay, Thyroid Stim Hormone    CBC With Differential With Platelet       Meds This Visit:  Requested Prescriptions     Signed Prescriptions Disp Refills    hydroCHLOROthiazide 25 MG Oral Tab 90 tablet 1     Sig: Take 1 tablet (25 mg total) by mouth daily.    albuterol (PROVENTIL HFA) 108 (90 Base) MCG/ACT Inhalation Aero Soln 1 each 2     Sig: Inhale 2 puffs into the lungs every 6 (six) hours as needed for Wheezing.       Imaging & Referrals:  [facility] TRISH 2D+3D SCREENING BILAT (CPT=77067/56587)  OP REFERRAL TO [facility] GI TELEPHONE COLON SCREEN

## 2024-09-17 ENCOUNTER — NURSE ONLY (OUTPATIENT)
Facility: CLINIC | Age: 54
End: 2024-09-17

## 2024-09-17 NOTE — PROGRESS NOTES
Lola- Patient has seen Dr Loya before for procedures, she has a hx of UC and had a flare up a few months back. Do you want patient to be seen in office, or can you provide orders for procedure?

## 2024-09-17 NOTE — PROGRESS NOTES
Called patient for scheduled TCS.  Medications, pharmacy, and allergies reviewed.   Please advise on colonoscopy and bowel prep orders.     Age 45-74 y/o:   › MD preference:   › Insurance:  BCBS PPO  › Last pcp Visit: 09/05/2024  Pcp within [facility]:  › Last CBC: 09/05/2024  › Date of positive FIT: N/A  › H/W/BMI: 5'2 / 191 LB/ 35    Special comments/notes:  Recall    Last Procedure, Date, MD:   06/30/2021   Last diagnosis:     Recalled for (mth/yrs):    Sedation used previously: MAC   Last Prep Used:    Quality of Prep:      Telephone Colon Screening Questionnaire Yes No   Are you currently experiencing any new/abnormal GI symptoms? [] [x]   If yes, explain:     Rectal bleeding? [] [x]   Black stool? [] [x]   Dysphagia or food \"feeling stuck\" when eating? [] [x]   Intractable vomiting? [] [x]   Unexplained weight loss? [] [x]   First colonoscopy? [] [x]   Family history of colon cancer? [] [x]   Any issues with anesthesia? [] [x]   If yes, explain:      Stroke, heart attack or stent placement in the last 12 months:  [] [x]   History of  respiratory issues/oxygen/MILTON/COPD: [] [x]   If yes, CPAP/BiPAP:     History of devices (pacemaker/defibrillator) [] [x]   History of cardiac/CVA/(MI/stroke): [] [x]     Medications Yes  No   Anticoagulants (except Aspirin):  [] [x]   Diabetic Meds  PO: Hold day before and day of procedure  Insulin:  [] [x]   Weight loss meds (phentermine/vyvanse/saxsenda/etc): [] [x]   Iron/herbal/multivitamin supplement: [x] []   Usage of marijuana, CBD &/or vape products: [] [x]

## 2024-11-06 ENCOUNTER — OFFICE VISIT (OUTPATIENT)
Facility: CLINIC | Age: 54
End: 2024-11-06
Payer: COMMERCIAL

## 2024-11-06 VITALS
HEIGHT: 62 IN | BODY MASS INDEX: 35.88 KG/M2 | WEIGHT: 195 LBS | HEART RATE: 82 BPM | SYSTOLIC BLOOD PRESSURE: 128 MMHG | DIASTOLIC BLOOD PRESSURE: 83 MMHG

## 2024-11-06 DIAGNOSIS — K52.9 INFLAMMATORY BOWEL DISEASE: ICD-10-CM

## 2024-11-06 DIAGNOSIS — R19.7 DIARRHEA, UNSPECIFIED TYPE: Primary | ICD-10-CM

## 2024-11-06 PROCEDURE — 99204 OFFICE O/P NEW MOD 45 MIN: CPT | Performed by: INTERNAL MEDICINE

## 2024-11-06 PROCEDURE — 3008F BODY MASS INDEX DOCD: CPT | Performed by: INTERNAL MEDICINE

## 2024-11-06 PROCEDURE — 3074F SYST BP LT 130 MM HG: CPT | Performed by: INTERNAL MEDICINE

## 2024-11-06 PROCEDURE — 3079F DIAST BP 80-89 MM HG: CPT | Performed by: INTERNAL MEDICINE

## 2024-11-07 ENCOUNTER — TELEPHONE (OUTPATIENT)
Facility: CLINIC | Age: 54
End: 2024-11-07

## 2024-11-07 ENCOUNTER — LAB ENCOUNTER (OUTPATIENT)
Dept: LAB | Facility: HOSPITAL | Age: 54
End: 2024-11-07
Attending: INTERNAL MEDICINE
Payer: COMMERCIAL

## 2024-11-07 DIAGNOSIS — K52.9 INFLAMMATORY BOWEL DISEASE: ICD-10-CM

## 2024-11-07 LAB
ALBUMIN SERPL-MCNC: 4.9 G/DL (ref 3.2–4.8)
ALBUMIN/GLOB SERPL: 1.7 {RATIO} (ref 1–2)
ALP LIVER SERPL-CCNC: 95 U/L
ALT SERPL-CCNC: 13 U/L
ANION GAP SERPL CALC-SCNC: 7 MMOL/L (ref 0–18)
AST SERPL-CCNC: 23 U/L (ref ?–34)
BASOPHILS # BLD AUTO: 0.08 X10(3) UL (ref 0–0.2)
BASOPHILS NFR BLD AUTO: 1.4 %
BILIRUB SERPL-MCNC: 0.5 MG/DL (ref 0.3–1.2)
BUN BLD-MCNC: 12 MG/DL (ref 9–23)
BUN/CREAT SERPL: 10.3 (ref 10–20)
CALCIUM BLD-MCNC: 9.9 MG/DL (ref 8.7–10.4)
CHLORIDE SERPL-SCNC: 107 MMOL/L (ref 98–112)
CO2 SERPL-SCNC: 28 MMOL/L (ref 21–32)
CREAT BLD-MCNC: 1.16 MG/DL
CRP SERPL-MCNC: <0.4 MG/DL (ref ?–1)
DEPRECATED HBV CORE AB SER IA-ACNC: 5 NG/ML
DEPRECATED RDW RBC AUTO: 41.4 FL (ref 35.1–46.3)
EGFRCR SERPLBLD CKD-EPI 2021: 56 ML/MIN/1.73M2 (ref 60–?)
EOSINOPHIL # BLD AUTO: 0.13 X10(3) UL (ref 0–0.7)
EOSINOPHIL NFR BLD AUTO: 2.3 %
ERYTHROCYTE [DISTWIDTH] IN BLOOD BY AUTOMATED COUNT: 16.3 % (ref 11–15)
ERYTHROCYTE [SEDIMENTATION RATE] IN BLOOD: 20 MM/HR
FASTING STATUS PATIENT QL REPORTED: YES
GLOBULIN PLAS-MCNC: 2.9 G/DL (ref 2–3.5)
GLUCOSE BLD-MCNC: 72 MG/DL (ref 70–99)
HCT VFR BLD AUTO: 37.4 %
HGB BLD-MCNC: 12.1 G/DL
IMM GRANULOCYTES # BLD AUTO: 0.01 X10(3) UL (ref 0–1)
IMM GRANULOCYTES NFR BLD: 0.2 %
IRON SATN MFR SERPL: 10 %
IRON SERPL-MCNC: 58 UG/DL
LYMPHOCYTES # BLD AUTO: 2.19 X10(3) UL (ref 1–4)
LYMPHOCYTES NFR BLD AUTO: 39.4 %
MCH RBC QN AUTO: 23.4 PG (ref 26–34)
MCHC RBC AUTO-ENTMCNC: 32.4 G/DL (ref 31–37)
MCV RBC AUTO: 72.5 FL
MONOCYTES # BLD AUTO: 0.5 X10(3) UL (ref 0.1–1)
MONOCYTES NFR BLD AUTO: 9 %
NEUTROPHILS # BLD AUTO: 2.65 X10 (3) UL (ref 1.5–7.7)
NEUTROPHILS # BLD AUTO: 2.65 X10(3) UL (ref 1.5–7.7)
NEUTROPHILS NFR BLD AUTO: 47.7 %
OSMOLALITY SERPL CALC.SUM OF ELEC: 292 MOSM/KG (ref 275–295)
PLATELET # BLD AUTO: 399 10(3)UL (ref 150–450)
POTASSIUM SERPL-SCNC: 4 MMOL/L (ref 3.5–5.1)
PROT SERPL-MCNC: 7.8 G/DL (ref 5.7–8.2)
RBC # BLD AUTO: 5.16 X10(6)UL
SODIUM SERPL-SCNC: 142 MMOL/L (ref 136–145)
TIBC SERPL-MCNC: 556 UG/DL (ref 250–425)
TRANSFERRIN SERPL-MCNC: 373 MG/DL (ref 250–380)
WBC # BLD AUTO: 5.6 X10(3) UL (ref 4–11)

## 2024-11-07 PROCEDURE — 83540 ASSAY OF IRON: CPT

## 2024-11-07 PROCEDURE — 82728 ASSAY OF FERRITIN: CPT

## 2024-11-07 PROCEDURE — 84466 ASSAY OF TRANSFERRIN: CPT

## 2024-11-07 PROCEDURE — 86480 TB TEST CELL IMMUN MEASURE: CPT

## 2024-11-07 PROCEDURE — 86140 C-REACTIVE PROTEIN: CPT

## 2024-11-07 PROCEDURE — 80053 COMPREHEN METABOLIC PANEL: CPT

## 2024-11-07 PROCEDURE — 85652 RBC SED RATE AUTOMATED: CPT

## 2024-11-07 PROCEDURE — 36415 COLL VENOUS BLD VENIPUNCTURE: CPT

## 2024-11-07 PROCEDURE — 85025 COMPLETE CBC W/AUTO DIFF WBC: CPT

## 2024-11-07 NOTE — PATIENT INSTRUCTIONS
1.  Obtain blood work and stool studies.  2.  We will begin the approval process for Humira (adalimumab).  3.  You should receive vaccines for pneumonia and shingles if not already done.  4.  Please contact me if your symptoms worsen.

## 2024-11-07 NOTE — TELEPHONE ENCOUNTER
GI RNs: Please begin the approval process for adalimumab.  The patient would receive standard induction dosing of 160/80/40 mg and 40 mg every other week.  Please also ask the patient if she has been vaccinated for pneumonia and shingles.  If not she should receive these vaccines through her pharmacy or Dr. Arias's office.

## 2024-11-07 NOTE — PROGRESS NOTES
Subjective:   Patient ID: Anthony Trinh is a 54 year old female.    HPI  Anthony returns in follow-up.  Unfortunately she has not been seen since her last colonoscopy in June 2021 despite a complex past IBD history.    As per previous notes Anthony has a remote history of a nonspecific colitis dating back to 2007.  A colonoscopy in March 2013 revealed a patchy colitis.  Mesalamine therapy was discussed but not prescribed.  The patient was clinically well until late September/early October 2020 when she developed a severe steroid refractory flare requiring #2 hospitalizations.  Infliximab was started in October 2020.  Anthony's symptoms improved but did not completely resolve.  An unprepped flexible sigmoidoscopy in November revealed persistent but improved well-circumscribed ulcerations in the setting of normal mucosa.  Anthony required resumption of prednisone in December with a subsequent taper.  L laboratory testing in December 2020 revealed an infliximab level of 27 and a normal CRP.     At the time of a March 2021 visit Anthony's symptoms had significantly improved and was able to taper off the prednisone.  Colonoscopy in June 2021 revealed quiescent colitis with multiple pseudopolyps in the mid transverse, descending and sigmoid colon.  The rectum had more of a normal appearance with the exception of an occasional pseudopolyp.  Biopsies of the colon showed focal architectural distortion within the rectum, sigmoid and descending colon with mildly active colitis in the latter.  The patient was advised to continue infliximab maintenance and proceed with a surveillance colonoscopy this year.  The patient contacted our office to schedule the procedure, however, in light of the fact that she has not been seen for the past 3 years and related symptoms she was advised to be seen in the office prior to scheduling.    Current history:  Anthony has had significant family issues as her  had fallen sustaining a brain  injury requiring extensive rehabilitation.  She unfortunately stopped the infliximab in 2022.  She did not notify us that she discontinued the infliximab.    Beginning in July 2024 Anthony has noted a recurrence of IBD symptoms although not as severe as before.  She notes abdominal cramping and back pain along with fatigue and increased stool frequency and fluidity.  She is having #8 bowel movements daily that are either \"soft serve\" or diarrhea.  When her colitis is completely quiescent she has #4 bowel movements daily.    Teodoro appetite is good.  Her weight is stable.  She has had no severe pain.  No bleeding.  She denies fevers.    Anthony was vaccinated for influenza and COVID-19 this fall.  It does not appear that she has received pneumonia or zoster vaccinations.      History/Other:   Review of Systems  See above    Wt Readings from Last 6 Encounters:   11/06/24 195 lb (88.5 kg)   09/05/24 191 lb 6.4 oz (86.8 kg)   04/14/23 171 lb (77.6 kg)   10/26/21 165 lb (74.8 kg)   10/21/21 164 lb 9.6 oz (74.7 kg)   08/25/21 166 lb 8 oz (75.5 kg)       Current Outpatient Medications   Medication Sig Dispense Refill    hydroCHLOROthiazide 25 MG Oral Tab Take 1 tablet (25 mg total) by mouth daily. 90 tablet 1    albuterol (PROVENTIL HFA) 108 (90 Base) MCG/ACT Inhalation Aero Soln Inhale 2 puffs into the lungs every 6 (six) hours as needed for Wheezing. 1 each 2    traMADol 50 MG Oral Tab Take 1 tablet (50 mg total) by mouth every 6 (six) hours as needed. 30 tablet 0    Ferrous Sulfate (FEOSOL) 325 (65 Fe) MG Oral Tab Take 1 tablet (325 mg total) by mouth 2 (two) times daily. (Patient taking differently: Take 1 tablet (325 mg total) by mouth 2 (two) times daily. Taking OTC  IRON) 60 tablet 2    VITAMIN D, CHOLECALCIFEROL, OR Take by mouth.       Allergies:Allergies[1]    Objective:   Physical Exam  Vitals and nursing note reviewed.   Constitutional:       General: She is not in acute distress.     Appearance: She is  well-developed. She is not ill-appearing, toxic-appearing or diaphoretic.      Comments: Does not appear ill or toxic   HENT:      Head: Normocephalic and atraumatic.      Mouth/Throat:      Pharynx: No oropharyngeal exudate.   Eyes:      General: No scleral icterus.     Conjunctiva/sclera: Conjunctivae normal.   Neck:      Thyroid: No thyromegaly.   Cardiovascular:      Rate and Rhythm: Normal rate and regular rhythm.      Heart sounds: Normal heart sounds.   Pulmonary:      Effort: Pulmonary effort is normal. No respiratory distress.      Breath sounds: Normal breath sounds. No wheezing or rales.   Abdominal:      General: Bowel sounds are normal. There is no distension.      Palpations: Abdomen is soft. There is no mass.      Tenderness: There is abdominal tenderness (Minimal direct right lower quadrant tenderness). There is no guarding or rebound.   Musculoskeletal:      Cervical back: Neck supple.   Lymphadenopathy:      Cervical: No cervical adenopathy.   Neurological:      Mental Status: She is alert and oriented to person, place, and time.   Psychiatric:         Behavior: Behavior normal.       Component      Latest Ref [facility] 9/5/2024   WBC      4.0 - 11.0 x10(3) uL 6.0    RBC      3.80 - 5.30 x10(6)uL 5.11    Hemoglobin      12.0 - 16.0 g/dL 12.0    Hematocrit      35.0 - 48.0 % 38.8    MCV      80.0 - 100.0 fL 75.9 (L)    MCH      26.0 - 34.0 pg 23.5 (L)    MCHC      31.0 - 37.0 g/dL 30.9 (L)    RDW-SD      35.1 - 46.3 fL 44.5    RDW      11.0 - 15.0 % 16.5 (H)    Platelet Count      150.0 - 450.0 10(3)uL 365.0    Prelim Neutrophil Abs      1.50 - 7.70 x10 (3) uL 3.43    Neutrophils Absolute      1.50 - 7.70 x10(3) uL 3.43    Lymphocytes Absolute      1.00 - 4.00 x10(3) uL 1.83    Monocytes Absolute      0.10 - 1.00 x10(3) uL 0.51    Eosinophils Absolute      0.00 - 0.70 x10(3) uL 0.13    Basophils Absolute      0.00 - 0.20 x10(3) uL 0.08    Immature Granulocyte Absolute      0.00 - 1.00 x10(3) uL 0.01     Neutrophils %      % 57.2    Lymphocytes %      % 30.6    Monocytes %      % 8.5    Eosinophils %      % 2.2    Basophils %      % 1.3    Immature Granulocyte %      % 0.2    Glucose      70 - 99 mg/dL 79    Sodium      136 - 145 mmol/L 143    Potassium      3.5 - 5.1 mmol/L 4.3    Chloride      98 - 112 mmol/L 109    Carbon Dioxide, Total      21.0 - 32.0 mmol/L 26.0    ANION GAP      0 - 18 mmol/L 8    BUN      9 - 23 mg/dL 13    CREATININE      0.55 - 1.02 mg/dL 1.22 (H)    BUN/CREATININE RATIO      10.0 - 20.0  10.7    CALCIUM      8.7 - 10.4 mg/dL 9.3    CALCULATED OSMOLALITY      275 - 295 mOsm/kg 295    EGFR      >=60 mL/min/1.73m2 53 (L)    ALT (SGPT)      10 - 49 U/L 11    AST (SGOT)      <34 U/L 21    ALKALINE PHOSPHATASE      41 - 108 U/L 86    Total Bilirubin      0.3 - 1.2 mg/dL 0.3    PROTEIN, TOTAL      5.7 - 8.2 g/dL 7.2    Albumin      3.2 - 4.8 g/dL 4.3    Globulin      2.0 - 3.5 g/dL 2.9    A/G Ratio      1.0 - 2.0  1.5    Patient Fasting for CMP? No    Cholesterol, Total      <200 mg/dL 175    HDL Cholesterol      40 - 59 mg/dL 58    Triglycerides      30 - 149 mg/dL 113    LDL Cholesterol Calc      <100 mg/dL 97    VLDL      0 - 30 mg/dL 19    NON-HDL CHOLESTEROL      <130 mg/dL 117    Patient Fasting for Lipid? No    TSH      0.550 - 4.780 mIU/mL 2.497       Legend:  (L) Low  (H) High    Assessment & Plan:   1. Diarrhea, unspecified type    2. Inflammatory bowel disease    Anthony has a history of severe colonic inflammatory bowel disease (unclear if left-sided ulcerative colitis or Crohn's disease).  She required hospitalization and was steroid refractory requiring eventual infliximab rescue.  She responded to the infliximab both clinically and endoscopically.  Unfortunately she discontinued therapy in 2022.  I suspect active colonic IBD.  We have discussed options.  Resuming infliximab and obtaining a drug level 1 week after initial infusion is an option although I cannot assure the patient  that ongoing infliximab therapy would be possible.  We discussed an alternative anti-TNF agent such as adalimumab.  We also have discussed newer agents such as ustekinumab or risankizumab.  After discussion we are favoring adalimumab.  I am recommending that we repeat laboratory testing including chemistries, a CBC, inflammatory markers, QuantiFERON gold and a fecal calprotectin and C. difficile toxin assay.  The patient's hemoglobin and MCV were recently found to be at the lower ranges of normal.  Iron studies will also be obtained.  We will begin the approval process for adalimumab.  The patient will contact us if her symptoms worsen.  A surveillance colonoscopy will be performed once IBD therapy commences and the patient's symptoms are controlled.      Orders Placed This Encounter   Procedures    Quantiferon TB Plus    Ferritin    Iron And Tibc    C-Reactive Protein    CBC W Differential W Platelet    Sed Rate, Westergren (Automated)    Comp Metabolic Panel (14)    Calprotectin, Fecal    C. diff toxigenic PCR (OPT)       Meds This Visit:  Requested Prescriptions      No prescriptions requested or ordered in this encounter       Imaging & Referrals:  None         [1]   Allergies  Allergen Reactions    Seasonal Runny nose

## 2024-11-08 ENCOUNTER — LAB ENCOUNTER (OUTPATIENT)
Dept: LAB | Facility: HOSPITAL | Age: 54
End: 2024-11-08
Attending: INTERNAL MEDICINE
Payer: COMMERCIAL

## 2024-11-08 DIAGNOSIS — K52.9 INFLAMMATORY BOWEL DISEASE: ICD-10-CM

## 2024-11-08 DIAGNOSIS — E61.1 IRON DEFICIENCY: Primary | ICD-10-CM

## 2024-11-08 DIAGNOSIS — R19.7 DIARRHEA, UNSPECIFIED TYPE: ICD-10-CM

## 2024-11-08 LAB — C DIFF TOX B STL QL: NEGATIVE

## 2024-11-08 PROCEDURE — 83993 ASSAY FOR CALPROTECTIN FECAL: CPT

## 2024-11-08 PROCEDURE — 87493 C DIFF AMPLIFIED PROBE: CPT

## 2024-11-09 LAB
CALPROTECTIN STL-MCNT: 132 ΜG/G (ref ?–50)
M TB IFN-G CD4+ T-CELLS BLD-ACNC: 0.01 IU/ML
M TB TUBERC IFN-G BLD QL: NEGATIVE
M TB TUBERC IGNF/MITOGEN IGNF CONTROL: >10 IU/ML
QFT TB1 AG MINUS NIL: 0 IU/ML
QFT TB2 AG MINUS NIL: 0 IU/ML

## 2024-11-11 RX ORDER — ADALIMUMAB 40MG/0.4ML
40 KIT SUBCUTANEOUS
Qty: 2 EACH | Refills: 11 | Status: SHIPPED | OUTPATIENT
Start: 2024-11-11

## 2024-11-11 RX ORDER — ADALIMUMAB 80MG/0.8ML
KIT SUBCUTANEOUS
Qty: 3 EACH | Refills: 0 | Status: SHIPPED | OUTPATIENT
Start: 2024-11-11

## 2024-11-11 NOTE — TELEPHONE ENCOUNTER
Received approval letter from [employer].    Humira pen 80 mg/0.8 ml auto injector, up to 2 pens per 28 days.    Case # [id number]    I spoke to [facility] pharmacy help desk, Kristi CAMPBELL to verify the above approval.    As per Kristi, it requires another prior authorization for the Humira 40 mg/0.4 ml maintenance dose which I will obtain after the patient receives the induction doses.    Reference # [id number]    Prescription for humira was sent to [facility].

## 2024-11-11 NOTE — TELEPHONE ENCOUNTER
Received a call back from the patient, date of birth and name verified.    The patient was notified of MD message regarding shingles and pneumococcal vaccinations as well as the adalimumab approva.    Advised to enroll in the Humira nurse ambassador and co pay assistance program.    As per the patient, she has knowledge and experience on how to administer the subcutaneous injection.    The patient verbalized understanding of information given, she will let GI know after she receives the vaccinations and after applying for the Humira complete program.

## 2024-11-11 NOTE — TELEPHONE ENCOUNTER
Left message to call back    Will discuss for [facility] to [facility] and vaccinations recommended by Dr Campos (see below message from 11/7/2024).    Please transfer to GI    Thanks

## 2024-11-14 ENCOUNTER — TELEPHONE (OUTPATIENT)
Dept: FAMILY MEDICINE CLINIC | Facility: CLINIC | Age: 54
End: 2024-11-14

## 2024-11-14 ENCOUNTER — IMMUNIZATION (OUTPATIENT)
Dept: LAB | Age: 54
End: 2024-11-14
Attending: EMERGENCY MEDICINE
Payer: COMMERCIAL

## 2024-11-14 DIAGNOSIS — Z23 NEED FOR SHINGLES VACCINE: Primary | ICD-10-CM

## 2024-11-14 DIAGNOSIS — Z23 NEED FOR VACCINATION: Primary | ICD-10-CM

## 2024-11-14 PROCEDURE — 90480 ADMN SARSCOV2 VAC 1/ONLY CMP: CPT

## 2024-11-14 PROCEDURE — 90656 IIV3 VACC NO PRSV 0.5 ML IM: CPT

## 2024-11-14 PROCEDURE — 90471 IMMUNIZATION ADMIN: CPT

## 2024-11-14 NOTE — TELEPHONE ENCOUNTER
Patient stated that she saw Dr Loya and he wants to start her on a new treatment Humira for ulcerative colitis, but wants to make sure patient gets vaccinated for pneumonia, shingles, and meningitis first. Patient would like to get the vaccines done through [facility]. Please advise on orders.     Advised patient that she is up to date on the Tdap vaccine- done on 10/26/2021. Good for 10 years.

## 2024-11-14 NOTE — TELEPHONE ENCOUNTER
Patient called stating that she needs immunizations for a new treatment she's starting. Per patient she needs the following vaccines:    Shingles    Meningococcal    Pneumonia      Whooping cough vaccine- Daughter having a baby

## 2024-11-19 NOTE — TELEPHONE ENCOUNTER
Shingles vaccine was pended. Please order pneumonia vaccine as I'm not sure which one you want patient to receive. Also for meningitis vaccine, there are 2 menactra and menveo. Please order

## 2024-11-20 NOTE — TELEPHONE ENCOUNTER
Called and spoke to the patient, date of birth and name verified to follow up.    She stated she had called her PCP but has not received orders for the shingles and pneumococcal vaccines. She will follow up today.    She plans to apply for the Humira complete program today.

## 2024-11-20 NOTE — TELEPHONE ENCOUNTER
Dr Campos    The patient still needs to get the recommended vaccines.    She wants to enroll in the [facility] Complete program for the savings options and the nurse ambassador program.    Please review and sign the prescriber section. Placed on your desk.    Thank you

## 2024-11-22 ENCOUNTER — NURSE ONLY (OUTPATIENT)
Dept: FAMILY MEDICINE CLINIC | Facility: CLINIC | Age: 54
End: 2024-11-22

## 2024-11-22 DIAGNOSIS — Z23 NEED FOR SHINGLES VACCINE: ICD-10-CM

## 2024-11-22 PROCEDURE — 90472 IMMUNIZATION ADMIN EACH ADD: CPT | Performed by: FAMILY MEDICINE

## 2024-11-22 PROCEDURE — 90750 HZV VACC RECOMBINANT IM: CPT | Performed by: FAMILY MEDICINE

## 2024-11-22 PROCEDURE — 90734 MENACWYD/MENACWYCRM VACC IM: CPT | Performed by: FAMILY MEDICINE

## 2024-11-22 PROCEDURE — 90471 IMMUNIZATION ADMIN: CPT | Performed by: FAMILY MEDICINE

## 2024-11-22 PROCEDURE — 90677 PCV20 VACCINE IM: CPT | Performed by: FAMILY MEDICINE

## 2024-11-22 NOTE — PROGRESS NOTES
Patient is here for Shingrix, Prevnar and Menveo vaccinations. Tolerated well, no adverse reaction noted.

## 2024-11-27 RX ORDER — ALBUTEROL SULFATE 90 UG/1
2 INHALANT RESPIRATORY (INHALATION) EVERY 6 HOURS PRN
Qty: 8.5 G | Refills: 0 | Status: SHIPPED | OUTPATIENT
Start: 2024-11-27

## 2024-11-27 NOTE — TELEPHONE ENCOUNTER
Received approval letter from [employer].    Humira  (2 pen) 40 mg/ 0.4 ml auto injector kit, 2 pens per 28 days does not require authorization at this time and may be covered at the pharmacy in accordance with your benefit plan.    Case#[id number]

## 2024-11-27 NOTE — TELEPHONE ENCOUNTER
Please review; protocol failed/ has no protocol    Requested Prescriptions   Pending Prescriptions Disp Refills    ALBUTEROL 108 (90 Base) MCG/ACT Inhalation Aero Soln [Pharmacy Med Name: ALBUTEROL HFA INH (200 PUFFS) 8.5GM] 8.5 g 0     Sig: INHALE 2 PUFFS INTO THE LUNGS EVERY 6 HOURS AS NEEDED FOR WHEEZING       Asthma & COPD Medication Protocol Failed - 11/27/2024  9:50 AM        Failed - ACT Score greater than or equal to 20                Failed - ACT recorded in the last 12 months                Passed - Appointment in past 6 or next 3 months      Recent Outpatient Visits              5 days ago Need for shingles vaccine    [facility]    Nurse Only    3 weeks ago [facility], [facility]    [facility]    2 months [facility]    [facility]    1 year ago Encounter for screening mammogram for breast cancer    [facility]    [facility]                         Recent Outpatient Visits              5 days ago Need for shingles vaccine    [facility]    Nurse Only    3 weeks ago [facility], [facility] type    [facility]    [facility]    2 months ago [facility] mammogram for breast cancer    [facility]    [facility] Visit    1 year ago Encounter for screening mammogram for breast cancer    [facility]    [facility] Visit

## 2024-11-27 NOTE — TELEPHONE ENCOUNTER
Prior authorization for the Humira maintenance dose submitted to [employer] ([id number]) ([phone] P)

## 2024-11-27 NOTE — TELEPHONE ENCOUNTER
I spoke to Maged CASILLAS from [employer] in follow up of the prior authorization.    He stated the starter pack dose is ready for shipment. They have called the patient on 11/15, 11/18 and 11/19/2024. Her co pay $3.    He will call the patient again today.    Approval case # given.

## 2024-12-04 NOTE — TELEPHONE ENCOUNTER
I personally spoke to the patient, she came to the office with the Humira medication.    She received the starter dose, she reported the medication was left outside by the carrier for less than 1 hour.    She wants to know if it is still fit for use.    Advised the patient to call [facility] and speak to the pharmacist which she did and the pharmacy had mentioned she can still use it.    She stated she had communicated with the nurse and applied for the co pay assistance program. She plans to get the first dose today.    Informed that prior authorization for the maintenance dose was approved.    She was appreciative with everything.

## 2025-04-07 ENCOUNTER — TELEPHONE (OUTPATIENT)
Facility: CLINIC | Age: 55
End: 2025-04-07

## 2025-04-07 NOTE — TELEPHONE ENCOUNTER
1st,overdue reminder letter mailed out to patient   Labs order :    Orders Placed on 11/8/2024    CBC With Differential With Platelet  Ferritin  Iron And Tibc

## 2025-06-06 RX ORDER — HYDROCHLOROTHIAZIDE 25 MG/1
25 TABLET ORAL DAILY
Qty: 90 TABLET | Refills: 3 | Status: SHIPPED | OUTPATIENT
Start: 2025-06-06

## 2025-06-06 NOTE — TELEPHONE ENCOUNTER
Refill Per Protocol     Requested Prescriptions   Pending Prescriptions Disp Refills    HYDROCHLOROTHIAZIDE 25 MG Oral Tab [Pharmacy Med Name: HYDROCHLOROTHIAZIDE 25MGTABLETS] 90 tablet 1     Sig: TAKE 1 TABLET(25 MG) BY MOUTH DAILY       Hypertension Medications Protocol Passed - 6/6/2025  8:09 AM        Passed - CMP or BMP in past 12 months        Passed - Last BP reading less than 140/90     BP Readings from Last 1 Encounters:   11/06/24 128/83               Passed - In person appointment or virtual visit in the past 12 mos or appointment in next 3 mos     Recent Outpatient Visits              6 months ago Need for shingles vaccine    [facility]    Nurse Only    7 months ago [facility], [facility]    8 months [facility]    9 months ago [facility]    [facility]    2 years ago Encounter for screening mammogram for breast cancer    [facility]    [facility]                      Passed - EGFRCR or GFRAA > 50     GFR Evaluation  EGFRCR: 56 , resulted on 11/7/2024          Passed - Medication is active on med list

## 2025-06-23 NOTE — TELEPHONE ENCOUNTER
called to verify if could schedule shingles vaccine earlier than January 2025. Advised that Dr Arias put in order for the vaccines, so should be able to get it done now. Just needs to schedule a nurse visit.  will call the patient to schedule nurse visit.    Called patient for HFU and received voicemail. Left message for patient.

## 2025-07-22 ENCOUNTER — TELEPHONE (OUTPATIENT)
Dept: FAMILY MEDICINE CLINIC | Facility: CLINIC | Age: 55
End: 2025-07-22

## 2025-07-29 ENCOUNTER — TELEPHONE (OUTPATIENT)
Facility: CLINIC | Age: 55
End: 2025-07-29

## 2025-07-29 DIAGNOSIS — K52.9 INFLAMMATORY BOWEL DISEASE: Primary | ICD-10-CM

## 2025-07-29 DIAGNOSIS — R19.7 DIARRHEA, UNSPECIFIED TYPE: ICD-10-CM

## 2025-08-29 ENCOUNTER — TELEPHONE (OUTPATIENT)
Facility: CLINIC | Age: 55
End: 2025-08-29

## (undated) DEVICE — LINE MNTR ADLT SET O2 INTMD

## (undated) DEVICE — FORCEP RADIAL JAW 4

## (undated) DEVICE — Device: Brand: DEFENDO AIR/WATER/SUCTION AND BIOPSY VALVE

## (undated) DEVICE — Device: Brand: CUSTOM PROCEDURE KIT

## (undated) DEVICE — MEDI-VAC NON-CONDUCTIVE SUCTION TUBING 6MM X 1.8M (6FT.) L: Brand: CARDINAL HEALTH

## (undated) DEVICE — 6 ML SYRINGE LUER-LOCK TIP: Brand: MONOJECT

## (undated) DEVICE — 35 ML SYRINGE REGULAR TIP: Brand: MONOJECT

## (undated) DEVICE — 3 ML SYRINGE LUER-LOCK TIP: Brand: MONOJECT

## (undated) NOTE — MR AVS SNAPSHOT
[facility] [address] [place] [address] [phone]               Thank you for choosing us for your health care visit with Nicolás Jean MD.  We are glad to serve you and happy to provide you with this summary of your v 123/66 mmHg 58 5' 2\" (1.575 m) 162 lb 29.62 kg/m2         Current Medications          This list is accurate as of: 5/18/17 12:05 PM.  Always use your most recent med list.                Fluticasone Propionate 50 MCG/ACT Susp   2 sprays by Nasal route d

## (undated) NOTE — LETTER
07/20/21        [facility]      Dear Peggy Ricks,    1965 Merged with [facility] records indicate that you have outstanding lab work and or testing that was ordered for you and has not yet been completed:    VITAMIN D, 25-HYDROXY     To provid

## (undated) NOTE — LETTER
[address]      Authorization for Surgical Operation and Procedure     Date:__9/20/2020_________                                                                                                         [employer] 4.   Should the need arise during my operation or immediate post-operative period, I also consent to the administration of blood and/or blood products.   Further, I understand that despite careful testing and screening of blood or blood products by terence 8.   I recognize that in the event my procedure results in extended X-Ray/fluoroscopy time, I may develop a skin reaction. 9.  If I have a Do Not Attempt Resuscitation (DNAR) order in place, that status will be suspended while in the operating room, proc STATEMENT OF PHYSICIAN My signature below affirms that prior to the time of the procedure; I have explained to the patient and/or his/her legal representative, the risks and benefits involved in the proposed treatment and any reasonable alternative to the

## (undated) NOTE — ED AVS SNAPSHOT
Mac Chadwick   MRN: [id number]    Department:  [facility] Emergency Department   Date of Visit:  11/7/2018           Disclosure     Insurance plans vary and the physician(s) referred by the ER may not be covered by your plan.  Please cont CARE PHYSICIAN AT ONCE OR RETURN IMMEDIATELY TO THE EMERGENCY DEPARTMENT. If you have been prescribed any medication(s), please fill your prescription right away and begin taking the medication(s) as directed.   If you believe that any of the medications

## (undated) NOTE — LETTER
12/11/19        [facility]      Dear Suzanne Levine records indicate that you have outstanding lab work and or testing that was ordered for you and has not yet been completed:  Orders Placed This Encounter      CB

## (undated) NOTE — LETTER
3/8/2023    [address] [facility]            Dear Margaret Calabrese,      Our records indicate that you are due for an appointment for a Colonoscopy with Indu France MD. Our doctors are booking out about 3-5 months in advance for procedures. Please call our office to schedule a phone screening appointment to plan for the procedure(s). Your medical well-being is important to us. If your insurance requires a referral, please call your primary care office to request one.       Thank you,      The Physicians and Staff at [facility]

## (undated) NOTE — LETTER
Date & Time: 11/7/2018, 3:50 PM  Patient: Shashi Paez  Encounter Provider(s):    AMA Jiménez       To Whom It May Concern:    Margaret Elvis was seen and treated in our department on 11/7/2018.  She should be excused from work on 11/9

## (undated) NOTE — LETTER
4/7/2025          Anthony Trinh    [address] [facility] [address]         Dear Anthony,    Our records indicate that the tests ordered for you by Andres Loya MD  have not been done.  If you have, in fact, already completed the tests or you do not wish to have the tests done, please contact our office at THE NUMBER LISTED BELOW.  Otherwise, please proceed with the testing.  Enclosed is a duplicate order for your convenience.  Labs order :    Orders Placed on 11/8/2024  CBC With Differential With Platelet  Ferritin  Iron And Tibc     To schedule a test at any [facility] Facility, call Central Scheduling at [phone], Monday through Friday between 7:30am to 6pm and on Saturday between 8am and 1pm.   Evening and weekend appointments for your exam are available.         Sincerely,    Andres Loya MD  [address]  [facility] [id number] [phone]

## (undated) NOTE — LETTER
[facility]  Administration of Anesthesia  1.  I, nAne Jacobs, or _________________________________ acting on her behalf, (Patient) (Dependent/Representative) request to receive anesthesia for my pending procedure/operation/treatment 6. OBSTETRIC PATIENTS: Specific risks/consequences of spinal/epidural anesthesia may include itching, low blood pressure, difficulty urinating, slowing of the baby's heart rate and headache.  Rare risks include infections, high spinal block, spinal bleeding ___________________________________________________           _____________________________________________________  Date/Time                                                                                               Responsible person in case of minor

## (undated) NOTE — LETTER
21        [address]  :  1970        To whom it may concern: The above patient is under my care. She may return to work in full capacity.     If I can answer any questions regarding the

## (undated) NOTE — MR AVS SNAPSHOT
After Visit Summary   4/30/2021    Derrick Milner   MRN: [id number]           Visit Information     Date & Time  4/30/2021 10:30 AM Provider  [facility] Infusion Dept.  Phone  [phone]      You patients for a variety of conditions such as allergies, back pain and cold symptoms? Skip the drive and waiting room and online chat with a doctor face-to-face using your web-cam enabled computer or mobile device wherever you are.  Video Visits cost $50 and

## (undated) NOTE — MR AVS SNAPSHOT
After Visit Summary   6/25/2021    Dail Halsted   MRN: [id number]           Visit Information     Date & Time  6/25/2021 10:30 AM Provider  [facility]  Phone  [phone]      You online chat with a doctor face-to-face using your web-cam enabled computer or mobile device wherever you are. Video Visits cost $50 and can be paid hassle-free using a credit, debit, or health savings card. Not active on [employer]?  Ask us how to get signed

## (undated) NOTE — MR AVS SNAPSHOT
[facility] [employer] [place] [address] [phone]               Thank you for choosing us for your health care visit with Dick Briseno MD.  We are glad to serve you and happy to provide you with this summary of your v not sign up before the expiration date, you must request a new code. Your unique [employer] Access Code: [email or web address]  Expires: 5/16/2017  9:01 AM    If you have questions, you can call [phone] to talk to our [facility] Staff.  Remember, [employer]

## (undated) NOTE — MR AVS SNAPSHOT
After Visit Summary   8/25/2021    Vernon Hong   MRN: [id number]           Visit Information     Date & Time  8/25/2021 10:30 AM Provider  [facility] [address] [facility] [facility] Dept[facility]  Phone  [phone]      You pink eye. The cost for a Video Visit is currently $35.         If you receive a survey from [employer], please take a few minutes to complete it and provide feedback.  We strive to deliver the best patient experience and are looking for ways to make impro appointment Average cost  $120*     EMERGENCY ROOM Life-threatening emergencies needing immediate intervention at a hospital emergency room.  Average cost  $2,300*   *Cost varies based on your insurance coverage  For more information about hours, locations

## (undated) NOTE — LETTER
20      [address] [place]  :  1970        To whom it may concern: The above patient is under my care. She continues to be treated for her inflammatory bowel disease.   She is not yet ready to r

## (undated) NOTE — LETTER
[address]  [address] [place], [place]      Authorization for Surgical Operation and Procedure     Date:__9/20/2020_________                                                                                                         [facility] 4.   Should the need arise during my operation or immediate post-operative period, I also consent to the administration of blood and/or blood products.   Further, I understand that despite careful testing and screening of blood or blood products by terence 8.   I recognize that in the event my procedure results in extended X-Ray/fluoroscopy time, I may develop a skin reaction. 9.  If I have a Do Not Attempt Resuscitation (DNAR) order in place, that status will be suspended while in the operating room, proc STATEMENT OF PHYSICIAN My signature below affirms that prior to the time of the procedure; I have explained to the patient and/or his/her legal representative, the risks and benefits involved in the proposed treatment and any reasonable alternative to the

## (undated) NOTE — LETTER
11/13/2018              Anthony Ojeda        [address] [place] [address]         To Whom It May Concern,    Please excuse John Mohan from work until Monday, November 19, 2018. Feel free to contact my office with any questions.